# Patient Record
Sex: MALE | Race: WHITE | Employment: FULL TIME | URBAN - NONMETROPOLITAN AREA
[De-identification: names, ages, dates, MRNs, and addresses within clinical notes are randomized per-mention and may not be internally consistent; named-entity substitution may affect disease eponyms.]

---

## 2017-01-25 ENCOUNTER — OFFICE VISIT - GICH (OUTPATIENT)
Dept: FAMILY MEDICINE | Facility: OTHER | Age: 37
End: 2017-01-25

## 2017-01-25 ENCOUNTER — HISTORY (OUTPATIENT)
Dept: FAMILY MEDICINE | Facility: OTHER | Age: 37
End: 2017-01-25

## 2017-01-25 DIAGNOSIS — R69 ILLNESS: ICD-10-CM

## 2017-01-25 LAB
INFLUENZA ANTIGEN - HISTORICAL: NORMAL
STREP A ANTIGEN - HISTORICAL: NEGATIVE

## 2018-01-03 NOTE — PATIENT INSTRUCTIONS
"Patient Information     Patient Name MRN Dontrell Gonsalves [id number] Male 1980      Patient Instructions by Marysol Le MD at 2017  1:55 PM     Author:  Marysol Le MD  Service:  (none) Author Type:  Physician     Filed:  2017  2:31 PM  Encounter Date:  2017 Status:  Addendum     :  Marysol Le MD (Physician)        Related Notes: Original Note by Marysol Le MD (Physician) filed at 2017  1:55 PM            1.  Keep up on fluids  2.  Rest  3.  Work note provided  4.  Follow up worsening symptoms.         5  Weight Loss Strategies  1. Eat at least 3 meals a day and never skip breakfast.  2. Eat more slowly.  3. Decrease portion size.  4. Provide structure by using meal replacement bars or shakes, and/or low calorie frozen meals.  5. For good nutrition incorporate fruit, vegetables, whole grains, lean protein, and low-fat dairy.  6. Remove trigger foods from your environment to avoid impulse eating.  7. Increase physical activity: get a pedometer and aim for 10,000 steps a day or 30-35 minutes of activity 5 days per week.  8. Weigh yourself daily or at least weekly.  9. Keep a record of what you eat and your activity.  10. Establish a support system such as a friend, group or program.      11.  Read Constantine Raymond's \"Eat to Live\"   12.   Remember it is important to have a minimum of 1800 calories a day, okay to use olive oil, 40 grams of fiber daily.  No more than two servings ( the size of your palm) of red meat a week.      [employer] [place] Related topics   Colds   What are colds?   Colds are an infection of the head and chest caused by a virus. They are a type of upper respiratory infection (URI). They can affect your nose, throat, sinuses, eyes, and ears. A cold can also affect the tube that connects your middle ear and throat, as well as your windpipe, voice box, and the airways in your lungs.  What is the cause?  Over " 200 different viruses can cause colds. The infection spreads when viruses are passed to others by sneezing, coughing, or touching. You may also become infected by handling objects that were touched by someone with a cold. Some of the cold viruses live up to 3 hours on the skin and on objects, such as doorknobs or telephones.  You are more likely to get a cold if:    You are stressed.    You are tired.    You do not eat a healthy diet.    You are a smoker or are exposed to secondhand smoke.    You are living or working in crowded conditions.    You don t wash your hands often.  People tend to get fewer colds as they get older because they have already had many colds and their immune system is able to fight off some of the viruses that can cause colds.  What are the symptoms?   You usually start having cold symptoms 1 to 3 days after contact with a cold virus. Symptoms may include:    Scratchy or sore throat    Sneezing    Runny or stuffy nose    Cough    Watery eyes    Ears that feel stuffy or blocked    Slight fever (99 to 100 F, or 37.2 to 37.8 C)    Feeling tired    Headache    Loss of appetite  Cold and flu symptoms are similar. The difference is that when you have the flu, the symptoms start within a few hours. The symptoms of a cold develop more slowly.  How are they treated?   Most of the time you don t need to see your healthcare provider for treatment. There are no medicines that cure a cold. Medicines that you can buy at most drugstores can help relieve your symptoms. You can:    Get lots of rest.    Drink extra fluids, such as water, fruit juice, and tea.    Use a humidifier to put more moisture in the air. Avoid steam vaporizers because they can cause burns. Be sure to keep the humidifier clean, as recommended in the 's instructions. It's important to keep bacteria and mold from growing in the water container.    Take nonprescription medicine, such as acetaminophen, ibuprofen, or naproxen to  "treat pain and fever. Read the label and take as directed. Unless recommended by your healthcare provider, you should not take these medicines for more than 10 days.    Nonsteroidal anti-inflammatory medicines (NSAIDs), such as ibuprofen, naproxen, and aspirin, may cause stomach bleeding and other problems. These risks increase with age.    Acetaminophen may cause liver damage or other problems. Unless recommended by your provider, don't take more than 3000 milligrams (mg) in 24 hours. To make sure you don t take too much, check other medicines you take to see if they also contain acetaminophen. Ask your provider if you need to avoid drinking alcohol while taking this medicine.    Decongestants pills or nasal spray may reduce swelling in your nose and sinuses and lessen the amount of mucus. Use decongestants as directed. If you are using a nonprescription nasal-spray decongestant, generally you should not use it for more than 3 days. After 3 days it may make your symptoms worse. Ask your healthcare provider if it is OK for you to use a nasal spray decongestant longer than this.    Use cough drops, pain relievers, or salt water gargles for a sore throat. You can make a salt water gargle with 1 teaspoon table salt in 1 cup (8 ounces) of warm water.    You can buy many different medicines for coughs without a prescription. However, there is no proof that they will actually help your cough. Cough medicines may cause harm to young children, but they are generally safe for older children and adults.    If you need relief from a dry, hacking cough, choose a medicine labeled \"cough suppressant.\" A cough suppressant may help you cough less and sleep better. Cough medicines with the initials DM in the name contain the suppressant drug called dextromethorphan.    If you need to loosen mucus, choose a medicine labeled \"expectorant.\" Expectorants may help keep your mucus thin and bring it up from the lungs when you cough. This " may relieve chest congestion and make it easier to breathe. The drug used most often as an expectorant is guaifenesin.    Do not give a child under age 4 any cough and cold medicines unless you have instructions from your healthcare provider. Children over 6 years of age may be given cough drops or hard candies to relieve a sore throat or cough.    If you are pregnant, check first with your healthcare provider before taking any cold or cough medicines.  Colds usually last 1 to 2 weeks. Contact your healthcare provider if you have new or worsening symptoms or your symptoms last longer than 2 weeks.  How can I help prevent colds?  If you are sick, you can help protect others if you:    Don t go to work or school. Avoid contact with other people except to get medical care.    Cover your nose and mouth with a tissue when you cough or sneeze. Throw the tissue in the trash after you use it, and then wash your hands. If you don t have a tissue, cough or sneeze into your upper sleeve instead of your hands.    Clean your hands often with soap and water or an alcohol-based hand , especially after using tissues or coughing or sneezing into your hands.  To lower your risk of catching a cold:    Wash your hands often and especially after using the restroom, coughing, sneezing, or blowing your nose. Also wash your hands before eating or touching your eyes.    Stay at least 6 feet away from people who are sick, if you can.    Keep surfaces clean--especially bedside tables, surfaces in the bathroom, and toys for children. Some viruses and bacteria can live 2 hours or more on surfaces like cafeteria tables, doorknobs, and desks. Wipe them down with a household disinfectant according to directions on the label.    Take care of your health. Try to get at least 7 to 9 hours of sleep each night. Eat a healthy diet and try to keep a healthy weight. If you smoke, try to quit. If you want to drink alcohol, ask your healthcare  provider how much is safe for you to drink. Learn ways to manage stress. Exercise according to your healthcare provider's instructions.  Developed by [employer].  Adult Advisor 2016.2 published by [employer].  Last modified: 2014-10-21  Last reviewed: 2014-09-04  This content is reviewed periodically and is subject to change as new health information becomes available. The information is intended to inform and educate and is not a replacement for medical evaluation, advice, diagnosis or treatment by a healthcare professional.  References   Adult Advisor 2016.2 Index    Copyright   2016 [employer], a division of [employer] All rights reserved.        Index [place] Related topics   Strep Throat Test   What is a strep test?   A strep test looks for infection in the throat caused by bacteria called group A streptococcus.  Why is it done?   A strep test is done to find out if strep bacteria are causing a sore throat. If the test finds strep bacteria, your healthcare provider may prescribe antibiotics. Treatment with antibiotics may help you feel better sooner than if you do not have treatment. More importantly, it also lowers the chance of more serious problems that can be caused by strep, such as heart problems from rheumatic fever. Most other common causes of sore throat do not need treatment with antibiotics.  How do I prepare for this test?   You may need to avoid taking certain medicines before the test because they might affect the test result. Make sure your healthcare provider knows about any medicines, herbs, or supplements that you are taking. Tell your healthcare provider if you took antibiotics during the 3 days before the test. Don't stop any of your regular medicines without first consulting with your healthcare provider.  Talk to your healthcare provider if you have any questions about the test.  How is the test done?   The strep test may be done in 2 ways: a rapid strep test or a throat  culture. For both tests your healthcare provider gets a sample by rubbing a cotton swab against the back of the throat. The sample is sent to a lab.    If the rapid strep test is done, the lab looks for a substance made by strep bacteria in the throat sample. If the test finds this substance, the result is positive and it means that strep bacteria were in the sample. The lab will have this result in 1 hour or less. Your healthcare provider may be able to do this test in their office.    If a throat culture is done, the swab is sent to the lab and tested for growth of the bacteria or other germs. If you have an infection, it may take several days to find out what kind of germ is causing it.  Ask your healthcare provider when and how you will get the result of the test.  What does the test result mean?   Usually, a positive strep test means that you have strep, and a negative (normal) result means that you do not have strep.  The rapid strep is very accurate and reliable if it is positive. If it is negative, your provider may choose to send a throat culture to confirm that the result is accurate.   What if my test result is not normal?   Test results are only one part of a larger picture that takes into account your medical history, physical exam, and current health. Sometimes a test needs to be repeated to check the first result. Talk to your healthcare provider about your result and ask questions, such as:    If you need more tests    What kind of treatment you might need    What other changes you might need to make  Developed by [employer].  Adult Advisor 2016.2 published by [employer].  Last modified: 2014-09-23  Last reviewed: 2014-09-22  This content is reviewed periodically and is subject to change as new health information becomes available. The information is intended to inform and educate and is not a replacement for medical evaluation, advice, diagnosis or treatment by a healthcare  professional.  References   Adult Advisor 2016.2 Index    Copyright   2016 [employer], a division of [employer] All rights reserved.

## 2018-01-03 NOTE — PROGRESS NOTES
Patient Information     Patient Name MRN Sex     Dontrell Garcia [id number] Male 1980      Progress Notes by Marysol Le MD at 2017  1:15 PM     Author:  Marysol Le MD Service:  (none) Author Type:  Physician     Filed:  2017  2:33 PM Encounter Date:  2017 Status:  Signed     :  Marysol Le MD (Physician)            Nursing Notes:   Muna Zazueta  2017  1:16 PM  Signed  Patient states he is here today for a illness it started yesterday. It started with a fever yesterday, he had a headache, body aches, and a sore throat. His sore throat is gone today but he still feels ill. He needs a work note stating that he was here today.  Muna Zazueta LPN.......................... 2017  1:12 PM        Subjective:  Dontrell Garcia is a 36 y.o. male who presents for upper respiratory infection     Patient reports that he has been ill since last night.  He started having sore throat, then headache then ran fever of 37.5.   He has body aches.   He is a nonsmoker.  He does not have asthma.   He did get the flu shot this year.      No nausea, vomiting, no diarrhea.  He denies any chest pain, cough, shortness of breath.  No palpitation.  No rash       Immunization History     Administered  Date(s) Administered     Influenza Virus, Unspecified 2016     Tdap 10/21/2016           No Known Allergies;l    No current outpatient prescriptions on file prior to visit.     No current facility-administered medications on file prior to visit.      Patient Active Problem List     Diagnosis  Code     Numbness R20.0         Social Hx:  Social History     Substance Use Topics       Smoking status: Never Smoker     Smokeless tobacco: Never Used     Alcohol use No     Social History Narrative    Preloaded 3/11/2013.                Family Hx:   Family History       Problem   Relation Age of Onset     Other  Mother      RA       Diabetes  Maternal  "Grandfather      Heart Disease  Maternal Grandfather      Cancer  Maternal Grandfather      pancreatic       Cancer-breast  Maternal Grandmother      Cancer  Paternal Grandmother      pancreatic         Objective:  Visit Vitals       /78     Pulse 92     Temp 97.8  F (36.6  C) (Temporal)     Ht 1.702 m (5' 7\")     Wt (!) 137 kg (302 lb)     BMI 47.3 kg/m2         Patient appears ill. PERRLA EOMI with mild conjunctivitis TMs are clear oral pharynx with mild erythema mild postnasal drip sinuses are nontender. Neck is thick supple without adenopathy lungs clear to auscultation without wheezes rhonchi or rales heart sounds S1-S2 and a regular rate and rhythm. Skin is warm to touch. Abdomen is obese soft nontender. Ext without edema     Results for orders placed or performed in visit on 01/25/17       INFLUENZA ANTIGEN A & B  CLINIC IN HOUSE       Result   Value Ref Range    INFLUENZA ANTIGEN                                 Negative for Influenza A antigen; Negative for Influenza B antigen     THROAT RAPID STREP A WITH REFLEX       Result   Value Ref Range    STREP A ANTIGEN            Negative Negative         Assessment:    ICD-10-CM    1. Illness R69 INFLUENZA ANTIGEN A & B  CLINIC IN HOUSE      INFLUENZA ANTIGEN A & B  CLINIC IN HOUSE      THROAT RAPID STREP A WITH REFLEX      THROAT RAPID STREP A WITH REFLEX   2. Influenza-like illness R69        Mr. Garcia's Body mass index is 47.3 kg/(m^2). This is out of the normal range for a 36 y.o. Normal range for ages 18-64 is between 18.5 and 24.9; normal range for ages 65+ is 23-30. To lose weight we reviewed risks and benefits of appropriate options such as diet, exercise, and medications. Patient's strategy will be  self-directed nutrition plan and self-directed exercise program     discussed need for rest, fluids  Follow up when necessary  Plan:   -- Expected clinical course discussed   -- Medications and their side effects discussed  Patient Instructions   1.  " "Keep up on fluids  2.  Rest  3.  Work note provided  4.  Follow up worsening symptoms.         5  Weight Loss Strategies  1. Eat at least 3 meals a day and never skip breakfast.  2. Eat more slowly.  3. Decrease portion size.  4. Provide structure by using meal replacement bars or shakes, and/or low calorie frozen meals.  5. For good nutrition incorporate fruit, vegetables, whole grains, lean protein, and low-fat dairy.  6. Remove trigger foods from your environment to avoid impulse eating.  7. Increase physical activity: get a pedometer and aim for 10,000 steps a day or 30-35 minutes of activity 5 days per week.  8. Weigh yourself daily or at least weekly.  9. Keep a record of what you eat and your activity.  10. Establish a support system such as a friend, group or program.      11.  Read Constantine Raymond's \"Eat to Live\"   12.   Remember it is important to have a minimum of 1800 calories a day, okay to use olive oil, 40 grams of fiber daily.  No more than two servings ( the size of your palm) of red meat a week.      Index Kyrgyz Related topics   Colds   What are colds?   Colds are an infection of the head and chest caused by a virus. They are a type of upper respiratory infection (URI). They can affect your nose, throat, sinuses, eyes, and ears. A cold can also affect the tube that connects your middle ear and throat, as well as your windpipe, voice box, and the airways in your lungs.  What is the cause?  Over 200 different viruses can cause colds. The infection spreads when viruses are passed to others by sneezing, coughing, or touching. You may also become infected by handling objects that were touched by someone with a cold. Some of the cold viruses live up to 3 hours on the skin and on objects, such as doorknobs or telephones.  You are more likely to get a cold if:    You are stressed.    You are tired.    You do not eat a healthy diet.    You are a smoker or are exposed to secondhand smoke.    You are living or " working in crowded conditions.    You don t wash your hands often.  People tend to get fewer colds as they get older because they have already had many colds and their immune system is able to fight off some of the viruses that can cause colds.  What are the symptoms?   You usually start having cold symptoms 1 to 3 days after contact with a cold virus. Symptoms may include:    Scratchy or sore throat    Sneezing    Runny or stuffy nose    Cough    Watery eyes    Ears that feel stuffy or blocked    Slight fever (99 to 100 F, or 37.2 to 37.8 C)    Feeling tired    Headache    Loss of appetite  Cold and flu symptoms are similar. The difference is that when you have the flu, the symptoms start within a few hours. The symptoms of a cold develop more slowly.  How are they treated?   Most of the time you don t need to see your healthcare provider for treatment. There are no medicines that cure a cold. Medicines that you can buy at most drugstores can help relieve your symptoms. You can:    Get lots of rest.    Drink extra fluids, such as water, fruit juice, and tea.    Use a humidifier to put more moisture in the air. Avoid steam vaporizers because they can cause burns. Be sure to keep the humidifier clean, as recommended in the 's instructions. It's important to keep bacteria and mold from growing in the water container.    Take nonprescription medicine, such as acetaminophen, ibuprofen, or naproxen to treat pain and fever. Read the label and take as directed. Unless recommended by your healthcare provider, you should not take these medicines for more than 10 days.    Nonsteroidal anti-inflammatory medicines (NSAIDs), such as ibuprofen, naproxen, and aspirin, may cause stomach bleeding and other problems. These risks increase with age.    Acetaminophen may cause liver damage or other problems. Unless recommended by your provider, don't take more than 3000 milligrams (mg) in 24 hours. To make sure you don t  "take too much, check other medicines you take to see if they also contain acetaminophen. Ask your provider if you need to avoid drinking alcohol while taking this medicine.    Decongestants pills or nasal spray may reduce swelling in your nose and sinuses and lessen the amount of mucus. Use decongestants as directed. If you are using a nonprescription nasal-spray decongestant, generally you should not use it for more than 3 days. After 3 days it may make your symptoms worse. Ask your healthcare provider if it is OK for you to use a nasal spray decongestant longer than this.    Use cough drops, pain relievers, or salt water gargles for a sore throat. You can make a salt water gargle with 1 teaspoon table salt in 1 cup (8 ounces) of warm water.    You can buy many different medicines for coughs without a prescription. However, there is no proof that they will actually help your cough. Cough medicines may cause harm to young children, but they are generally safe for older children and adults.    If you need relief from a dry, hacking cough, choose a medicine labeled \"cough suppressant.\" A cough suppressant may help you cough less and sleep better. Cough medicines with the initials DM in the name contain the suppressant drug called dextromethorphan.    If you need to loosen mucus, choose a medicine labeled \"expectorant.\" Expectorants may help keep your mucus thin and bring it up from the lungs when you cough. This may relieve chest congestion and make it easier to breathe. The drug used most often as an expectorant is guaifenesin.    Do not give a child under age 4 any cough and cold medicines unless you have instructions from your healthcare provider. Children over 6 years of age may be given cough drops or hard candies to relieve a sore throat or cough.    If you are pregnant, check first with your healthcare provider before taking any cold or cough medicines.  Colds usually last 1 to 2 weeks. Contact your healthcare " provider if you have new or worsening symptoms or your symptoms last longer than 2 weeks.  How can I help prevent colds?  If you are sick, you can help protect others if you:    Don t go to work or school. Avoid contact with other people except to get medical care.    Cover your nose and mouth with a tissue when you cough or sneeze. Throw the tissue in the trash after you use it, and then wash your hands. If you don t have a tissue, cough or sneeze into your upper sleeve instead of your hands.    Clean your hands often with soap and water or an alcohol-based hand , especially after using tissues or coughing or sneezing into your hands.  To lower your risk of catching a cold:    Wash your hands often and especially after using the restroom, coughing, sneezing, or blowing your nose. Also wash your hands before eating or touching your eyes.    Stay at least 6 feet away from people who are sick, if you can.    Keep surfaces clean--especially bedside tables, surfaces in the bathroom, and toys for children. Some viruses and bacteria can live 2 hours or more on surfaces like cafeteria tables, doorknobs, and desks. Wipe them down with a household disinfectant according to directions on the label.    Take care of your health. Try to get at least 7 to 9 hours of sleep each night. Eat a healthy diet and try to keep a healthy weight. If you smoke, try to quit. If you want to drink alcohol, ask your healthcare provider how much is safe for you to drink. Learn ways to manage stress. Exercise according to your healthcare provider's instructions.  Developed by [employer]  Adult Advisor 2016.2 published by [employer]  Last modified: 2014-10-21  Last reviewed: 2014-09-04  This content is reviewed periodically and is subject to change as new health information becomes available. The information is intended to inform and educate and is not a replacement for medical evaluation, advice, diagnosis or treatment by a healthcare  professional.  References   Adult Advisor 2016.2 Index    Copyright   2016 "[employer]", a division of [employer] All rights reserved.        Index Italian Related topics   Strep Throat Test   What is a strep test?   A strep test looks for infection in the throat caused by bacteria called group A streptococcus.  Why is it done?   A strep test is done to find out if strep bacteria are causing a sore throat. If the test finds strep bacteria, your healthcare provider may prescribe antibiotics. Treatment with antibiotics may help you feel better sooner than if you do not have treatment. More importantly, it also lowers the chance of more serious problems that can be caused by strep, such as heart problems from rheumatic fever. Most other common causes of sore throat do not need treatment with antibiotics.  How do I prepare for this test?   You may need to avoid taking certain medicines before the test because they might affect the test result. Make sure your healthcare provider knows about any medicines, herbs, or supplements that you are taking. Tell your healthcare provider if you took antibiotics during the 3 days before the test. Don't stop any of your regular medicines without first consulting with your healthcare provider.  Talk to your healthcare provider if you have any questions about the test.  How is the test done?   The strep test may be done in 2 ways: a rapid strep test or a throat culture. For both tests your healthcare provider gets a sample by rubbing a cotton swab against the back of the throat. The sample is sent to a lab.    If the rapid strep test is done, the lab looks for a substance made by strep bacteria in the throat sample. If the test finds this substance, the result is positive and it means that strep bacteria were in the sample. The lab will have this result in 1 hour or less. Your healthcare provider may be able to do this test in their office.    If a throat culture is  done, the swab is sent to the lab and tested for growth of the bacteria or other germs. If you have an infection, it may take several days to find out what kind of germ is causing it.  Ask your healthcare provider when and how you will get the result of the test.  What does the test result mean?   Usually, a positive strep test means that you have strep, and a negative (normal) result means that you do not have strep.  The rapid strep is very accurate and reliable if it is positive. If it is negative, your provider may choose to send a throat culture to confirm that the result is accurate.   What if my test result is not normal?   Test results are only one part of a larger picture that takes into account your medical history, physical exam, and current health. Sometimes a test needs to be repeated to check the first result. Talk to your healthcare provider about your result and ask questions, such as:    If you need more tests    What kind of treatment you might need    What other changes you might need to make  Developed by [employer].  Adult Advisor 2016.2 published by [employer].  Last modified: 2014-09-23  Last reviewed: 2014-09-22  This content is reviewed periodically and is subject to change as new health information becomes available. The information is intended to inform and educate and is not a replacement for medical evaluation, advice, diagnosis or treatment by a healthcare professional.  References   Adult Advisor 2016.2 Index    Copyright   2016 [employer], a division of [employer] All rights reserved.           Electronically signed by Marysol Le MD

## 2018-01-03 NOTE — NURSING NOTE
Patient Information     Patient Name MRN Dontrell Gonsalves [id number] Male 1980      Nursing Note by Muna Zazueta at 2017  1:15 PM     Author:  Muna Zazueta Service:  (none) Author Type:  (none)     Filed:  2017  1:16 PM Encounter Date:  2017 Status:  Signed     :  Muna Zazueta            Patient states he is here today for a illness it started yesterday. It started with a fever yesterday, he had a headache, body aches, and a sore throat. His sore throat is gone today but he still feels ill. He needs a work note stating that he was here today.  Muna Zazueta LPN.......................... 2017  1:12 PM

## 2018-01-26 ENCOUNTER — DOCUMENTATION ONLY (OUTPATIENT)
Dept: FAMILY MEDICINE | Facility: OTHER | Age: 38
End: 2018-01-26

## 2018-01-26 VITALS
WEIGHT: 302 LBS | HEIGHT: 67 IN | HEART RATE: 92 BPM | TEMPERATURE: 97.8 F | SYSTOLIC BLOOD PRESSURE: 126 MMHG | BODY MASS INDEX: 47.4 KG/M2 | DIASTOLIC BLOOD PRESSURE: 78 MMHG

## 2018-07-16 ENCOUNTER — OFFICE VISIT (OUTPATIENT)
Dept: PEDIATRICS | Facility: OTHER | Age: 38
End: 2018-07-16
Attending: INTERNAL MEDICINE

## 2018-07-16 VITALS
BODY MASS INDEX: 47.42 KG/M2 | HEIGHT: 68 IN | HEART RATE: 83 BPM | WEIGHT: 312.9 LBS | SYSTOLIC BLOOD PRESSURE: 143 MMHG | DIASTOLIC BLOOD PRESSURE: 96 MMHG

## 2018-07-16 DIAGNOSIS — Z13.29 SCREENING FOR THYROID DISORDER: ICD-10-CM

## 2018-07-16 DIAGNOSIS — Z13.220 LIPID SCREENING: ICD-10-CM

## 2018-07-16 DIAGNOSIS — I10 ESSENTIAL HYPERTENSION: Primary | ICD-10-CM

## 2018-07-16 DIAGNOSIS — E66.01 MORBID OBESITY DUE TO EXCESS CALORIES (H): ICD-10-CM

## 2018-07-16 DIAGNOSIS — Z13.1 SCREENING FOR DIABETES MELLITUS: ICD-10-CM

## 2018-07-16 DIAGNOSIS — R07.9 CHEST PAIN ON EXERTION: ICD-10-CM

## 2018-07-16 DIAGNOSIS — Z13.0 SCREENING, ANEMIA, DEFICIENCY, IRON: ICD-10-CM

## 2018-07-16 PROBLEM — R73.03 PRE-DIABETES: Status: ACTIVE | Noted: 2018-07-16

## 2018-07-16 LAB
ANION GAP SERPL CALCULATED.3IONS-SCNC: 9 MMOL/L (ref 3–14)
BUN SERPL-MCNC: 15 MG/DL (ref 7–25)
CALCIUM SERPL-MCNC: 9.6 MG/DL (ref 8.6–10.3)
CHLORIDE SERPL-SCNC: 104 MMOL/L (ref 98–107)
CHOLEST SERPL-MCNC: 265 MG/DL
CO2 SERPL-SCNC: 26 MMOL/L (ref 21–31)
CREAT SERPL-MCNC: 0.91 MG/DL (ref 0.7–1.3)
ERYTHROCYTE [DISTWIDTH] IN BLOOD BY AUTOMATED COUNT: 13.2 % (ref 10–15)
GFR SERPL CREATININE-BSD FRML MDRD: >90 ML/MIN/1.7M2
GLUCOSE SERPL-MCNC: 97 MG/DL (ref 70–105)
HBA1C MFR BLD: 5.9 % (ref 4–6)
HCT VFR BLD AUTO: 42.4 % (ref 40–53)
HDLC SERPL-MCNC: 39 MG/DL (ref 23–92)
HGB BLD-MCNC: 14.4 G/DL (ref 13.3–17.7)
LDLC SERPL CALC-MCNC: ABNORMAL MG/DL
MCH RBC QN AUTO: 29.7 PG (ref 26.5–33)
MCHC RBC AUTO-ENTMCNC: 34 G/DL (ref 31.5–36.5)
MCV RBC AUTO: 87 FL (ref 78–100)
NONHDLC SERPL-MCNC: 226 MG/DL
PLATELET # BLD AUTO: 292 10E9/L (ref 150–450)
POTASSIUM SERPL-SCNC: 3.9 MMOL/L (ref 3.5–5.1)
RBC # BLD AUTO: 4.85 10E12/L (ref 4.4–5.9)
SODIUM SERPL-SCNC: 139 MMOL/L (ref 134–144)
TRIGL SERPL-MCNC: 566 MG/DL
TSH SERPL DL<=0.05 MIU/L-ACNC: 1.46 IU/ML (ref 0.34–5.6)
WBC # BLD AUTO: 8.8 10E9/L (ref 4–11)

## 2018-07-16 PROCEDURE — 85027 COMPLETE CBC AUTOMATED: CPT | Performed by: INTERNAL MEDICINE

## 2018-07-16 PROCEDURE — 83036 HEMOGLOBIN GLYCOSYLATED A1C: CPT | Performed by: INTERNAL MEDICINE

## 2018-07-16 PROCEDURE — 80061 LIPID PANEL: CPT | Performed by: INTERNAL MEDICINE

## 2018-07-16 PROCEDURE — 84443 ASSAY THYROID STIM HORMONE: CPT | Performed by: INTERNAL MEDICINE

## 2018-07-16 PROCEDURE — 99214 OFFICE O/P EST MOD 30 MIN: CPT | Performed by: INTERNAL MEDICINE

## 2018-07-16 PROCEDURE — 80048 BASIC METABOLIC PNL TOTAL CA: CPT | Performed by: INTERNAL MEDICINE

## 2018-07-16 PROCEDURE — 36415 COLL VENOUS BLD VENIPUNCTURE: CPT | Performed by: INTERNAL MEDICINE

## 2018-07-16 PROCEDURE — 93000 ELECTROCARDIOGRAM COMPLETE: CPT | Performed by: INTERNAL MEDICINE

## 2018-07-16 RX ORDER — HYDROCHLOROTHIAZIDE 12.5 MG/1
TABLET ORAL
Qty: 60 TABLET | Refills: 0 | Status: SHIPPED | OUTPATIENT
Start: 2018-07-16 | End: 2019-02-26

## 2018-07-16 RX ORDER — HYDROCHLOROTHIAZIDE 25 MG/1
25 TABLET ORAL DAILY
Qty: 90 TABLET | Refills: 3 | Status: SHIPPED | OUTPATIENT
Start: 2018-08-16 | End: 2019-02-26

## 2018-07-16 NOTE — NURSING NOTE
Patient presents with concerns of hypertension.  Josseline Saravia LPN.........................7/16/2018  3:32 PM

## 2018-07-16 NOTE — MR AVS SNAPSHOT
After Visit Summary   7/16/2018    Dontrell Garcia    MRN: [id number]           Patient Information     Date Of Birth          1980        Visit Information        Provider Department      7/16/2018 3:15 PM Kody Moise MD [facility]        Today's Diagnoses     Essential hypertension    -  1    Morbid obesity due to excess calories (H)        Screening for diabetes mellitus        Lipid screening        Screening, anemia, deficiency, iron        Screening for thyroid disorder        Chest pain on exertion          Care Instructions     -- Low threshold for exertional stress test      Your BMI is Body mass index is 48.28 kg/(m^2).  (BMI ranges: Normal 18.5 - 25, Overweight 25 - 30, Obesity greater than 30,     Morbid Obesity greater than 40 or greater than 35 with associated conditions.)    Facts about losing weight:   -- Overweight and Obesity increase your risk for developing diabetes, high blood pressure and stroke, and shorten your life.   -- 90% of weight loss comes from dietary changes, only 10% from exercise    What should I do?   -- Work on 5-10% weight loss   -- Focus on a few healthy dietary changes   -- Eat more fresh fruits and vegetables, and fewer carbohydrates   -- Cut out all calorie-containing beverages (milk, juice, alcohol, etc)   -- Exercise every day   -- Weigh yourself once a week   -- Consider the DASH Diet (http://bit.DuXplore/DASHDiet - redirects to the NIH)   -- Consider Weight Watchers (http://www.weightwatchers.com)   -- Consider My Fitness Pal (iOS, Android, http://www.myfitnesspal.com)                Follow-ups after your visit        Future tests that were ordered for you today     Open Future Orders        Priority Expected Expires Ordered    Basic metabolic panel Routine  7/15/2019 7/16/2018            Who to contact     If you have questions or need follow up information about today's clinic visit or your schedule please contact   "[facility] directly at [phone].  Normal or non-critical lab and imaging results will be communicated to you by MyChart, letter or phone within 4 business days after the clinic has received the results. If you do not hear from us within 7 days, please contact the clinic through [employer] or phone. If you have a critical or abnormal lab result, we will notify you by phone as soon as possible.  Submit refill requests through [employer] or call your pharmacy and they will forward the refill request to us. Please allow 3 business days for your refill to be completed.          Additional Information About Your Visit        [employer] Information     [employer] lets you send messages to your doctor, view your test results, renew your prescriptions, schedule appointments and more. To sign up, go to [email or web address] . Click on \"Log in\" on the left side of the screen, which will take you to the Welcome page. Then click on \"Sign up Now\" on the right side of the page.     You will be asked to enter the access code listed below, as well as some personal information. Please follow the directions to create your username and password.     Your access code is: [email or web address]  Expires: 10/14/2018  4:06 PM     Your access code will  in 90 days. If you need help or a new code, please call your [facility] clinic or [phone].        Care EveryWhere ID     This is your Care EveryWhere ID. This could be used by other organizations to access your [facility] medical records  [id number]        Your Vitals Were     Pulse Height BMI (Body Mass Index)             83 5' 7.5\" (1.715 m) 48.28 kg/m2          Blood Pressure from Last 3 Encounters:   18 (!) 143/96   17 126/78   10/26/16 120/60    Weight from Last 3 Encounters:   18 312 lb 14.4 oz (141.9 kg)   17 (!) 302 lb (137 kg)   10/26/16 297 lb (134.7 kg)              We Performed the Following     Basic metabolic panel     CBC with platelets     EKG " 12-lead, tracing only     Hemoglobin A1c     Lipid Profile     Thyrotropin GH        Primary Care Provider Fax #    Physician No Ref-Primary [phone]       No address on file        Equal Access to Services     RENAN WILLIS : Beatriz Patrick, lissette buenoalfredoha, jean claude gerashelton sulaimanalexandra, geraldo maryin hayaan sulaimanirving kaur donita landaverde. [facility] [phone].    ATENCIÓN: Si habla español, tiene a nina disposición servicios gratuitos de asistencia lingüística. Llame al [phone].    We comply with applicable federal civil rights laws and [place] laws. We do not discriminate on the basis of race, color, national origin, age, disability, sex, sexual orientation, or gender identity.            Thank you!     Thank you for choosing [facility]  for your care. Our goal is always to provide you with excellent care. Hearing back from our patients is one way we can continue to improve our services. Please take a few minutes to complete the written survey that you may receive in the mail after your visit with us. Thank you!             Your Updated Medication List - Protect others around you: Learn how to safely use, store and throw away your medicines at www.disposemymeds.org.      Notice  As of 7/16/2018  4:07 PM    You have not been prescribed any medications.

## 2018-07-16 NOTE — PROGRESS NOTES
"Subjective  Dontrell Garcia is a 37 year old male who presents for multiple concerns.  He like to get his blood pressure checked.  It feels like when he had high blood pressure in the past during sports.  He could just feel a sensation that his blood pressure was elevated.  He thinks there might be something going on with his heart.  He is recently gained some weight and wants to work on losing it, wants to know if I have any recommendations.  Not currently taking any medications.  Has chest pains with exertion.  Described as worse with humidity.  No associated symptoms including no palpitations, dyspnea or diaphoresis, no radiation of pain.  Describes the pain as substernal in nature.  Declines STD testing.    Problem List/PMH: reviewed in EMR, and made relevant updates today.  Medications: reviewed in EMR, and made relevant updates today.  Allergies: reviewed in EMR, and made relevant updates today.    Social Hx:  Social History   Substance Use Topics     Smoking status: Never Smoker     Smokeless tobacco: Never Used     Alcohol use No     Social History     Social History Narrative    From [facility]    Runs business selling [employer]         I reviewed social history and made relevant updates today.    Family Hx:   Family History   Problem Relation Age of Onset     Other - See Comments Mother      RA     Diabetes Maternal Grandfather      Diabetes     HEART DISEASE Maternal Grandfather      Heart Disease     Cancer Maternal Grandfather      Cancer,pancreatic     Breast Cancer Maternal Grandmother      Cancer-breast     Cancer Paternal Grandmother      Cancer,pancreatic     No Known Problems Father        Objective  Vitals: reviewed in EMR.  BP (!) 143/96 (BP Location: Right arm)  Pulse 83  Ht 5' 7.5\" (1.715 m)  Wt 312 lb 14.4 oz (141.9 kg)  BMI 48.28 kg/m2    Gen: Pleasant male, NAD.  HEENT: MMM, no OP erythema.   Neck: Supple  CV: RRR no m/r/g.   Pulm: CTAB no w/r/r  GI: Soft, NT, ND. No HSM. No masses. " Bowel sounds present.  Neuro: Grossly intact  Msk: No lower extremity edema.  Skin: No concerning lesions.  Psychiatric: Normal affect and insight. Does not appear anxious or depressed.    Labs:  Lab Results   Component Value Date    HGB 15.1 09/02/2014    HCT 44.8 09/02/2014     09/02/2014     09/02/2014    BUN 13 09/02/2014    CO2 28 09/02/2014     Results for orders placed or performed in visit on 07/16/18   EKG 12-lead, tracing only    Narrative    EKG Interpretation:   Rhythm: Sinus  Rate: 80  Axis: Normal  Conduction: Borderline prolongation of QTc  QRS: Normal  ST Segments: Normal  T-wave: Normal  Chambers: Normal  PACs Present: No  PVCs Present: No    Impression:   Normal EKG.  Comparison unavailable.    Signed, Kody Moise MD  Internal Medicine & Pediatrics  7/16/2018  4:12 PM           Assessment    ICD-10-CM    1. Essential hypertension I10 Basic metabolic panel     Basic metabolic panel     hydrochlorothiazide 12.5 MG TABS tablet     hydrochlorothiazide (HYDRODIURIL) 25 MG tablet   2. Morbid obesity due to excess calories (H) E66.01    3. Screening for diabetes mellitus Z13.1 Hemoglobin A1c   4. Lipid screening Z13.220 Lipid Profile   5. Screening, anemia, deficiency, iron Z13.0 CBC with platelets   6. Screening for thyroid disorder Z13.29 Thyrotropin GH   7. Chest pain on exertion R07.9 EKG 12-lead, tracing only     Orders Placed This Encounter   Procedures     Basic metabolic panel     CBC with platelets     Hemoglobin A1c     Lipid Profile     Basic metabolic panel     Thyrotropin GH     EKG 12-lead, tracing only       With regards to chest pain we discussed the risks and benefits of proceeding with stress testing.  At this time the patient decided he would like to wait.  Warning signs were discussed and repeat evaluation recommended should concerning symptoms develop or worsen.  Agree with working on weight loss, strategies discussed.  Referral to dietitian offered, patient decided to  wait for now.  With regards to blood pressure is elevated but not severe.  I recommend a trial of hydrochlorothiazide starting with 12.5 mg daily for the first week.  He should have a lab only in a couple of weeks for verifying normal electrolyte and kidney function.  I recommend a nurse only blood pressure check at that time as well.    Plan   -- Expected clinical course discussed   -- Medications and their side effects discussed  Patient Instructions    -- Low threshold for exertional stress test      Your BMI is Body mass index is 48.28 kg/(m^2).  (BMI ranges: Normal 18.5 - 25, Overweight 25 - 30, Obesity greater than 30,     Morbid Obesity greater than 40 or greater than 35 with associated conditions.)    Facts about losing weight:   -- Overweight and Obesity increase your risk for developing diabetes, high blood pressure and stroke, and shorten your life.   -- 90% of weight loss comes from dietary changes, only 10% from exercise    What should I do?   -- Work on 5-10% weight loss   -- Focus on a few healthy dietary changes   -- Eat more fresh fruits and vegetables, and fewer carbohydrates   -- Cut out all calorie-containing beverages (milk, juice, alcohol, etc)   -- Exercise every day   -- Weigh yourself once a week   -- Consider the DASH Diet (http://bit.ly/DASHDiet - redirects to the NIH)   -- Consider Weight Watchers (http://www.weightwatchers.com)   -- Consider My Fitness Pal (iOS, Android, http://www.NextStep.iopal.com)            No Follow-up on file.    Signed, Kody Moise MD  Internal Medicine & Pediatrics

## 2018-07-17 PROBLEM — E78.2 MIXED HYPERLIPIDEMIA: Status: ACTIVE | Noted: 2018-07-17

## 2018-07-17 PROBLEM — E78.1 HYPERTRIGLYCERIDEMIA: Status: ACTIVE | Noted: 2018-07-17

## 2018-07-24 NOTE — PROGRESS NOTES
Patient Information     Patient Name  Dontrell Garcia MRN  [id number] Sex  Male   1980      Letter by Marysol Le MD at      Author:  Marysol Le MD Service:  (none) Author Type:  (none)    Filed:   Encounter Date:  2017 Status:  (Other)             Work/School Excuse and Restrictions       Arrived:               Discharged:                                                               2:15  PM  2017        Dontrell Garcia  was seen today in the [facility] clinic with influenza like  symptoms.  He is not to return to work until afebrile and no productive cough for 24 hours.       If you have any questions regarding the validity of this note please call the number above.             Sincerely,         Electronically signed by Marysol Le MD

## 2019-02-26 ENCOUNTER — OFFICE VISIT (OUTPATIENT)
Dept: PEDIATRICS | Facility: OTHER | Age: 39
End: 2019-02-26
Attending: INTERNAL MEDICINE
Payer: COMMERCIAL

## 2019-02-26 VITALS
BODY MASS INDEX: 43.95 KG/M2 | HEIGHT: 68 IN | SYSTOLIC BLOOD PRESSURE: 120 MMHG | DIASTOLIC BLOOD PRESSURE: 82 MMHG | WEIGHT: 290 LBS | HEART RATE: 76 BPM | TEMPERATURE: 98.1 F | RESPIRATION RATE: 14 BRPM

## 2019-02-26 DIAGNOSIS — L91.8 SKIN TAG: ICD-10-CM

## 2019-02-26 DIAGNOSIS — K62.5 RECTAL BLEEDING: Primary | ICD-10-CM

## 2019-02-26 DIAGNOSIS — K59.00 CONSTIPATION, UNSPECIFIED CONSTIPATION TYPE: ICD-10-CM

## 2019-02-26 PROCEDURE — 99213 OFFICE O/P EST LOW 20 MIN: CPT | Performed by: INTERNAL MEDICINE

## 2019-02-26 ASSESSMENT — PATIENT HEALTH QUESTIONNAIRE - PHQ9
5. POOR APPETITE OR OVEREATING: NOT AT ALL
SUM OF ALL RESPONSES TO PHQ QUESTIONS 1-9: 0

## 2019-02-26 ASSESSMENT — ANXIETY QUESTIONNAIRES
2. NOT BEING ABLE TO STOP OR CONTROL WORRYING: NOT AT ALL
6. BECOMING EASILY ANNOYED OR IRRITABLE: NOT AT ALL
1. FEELING NERVOUS, ANXIOUS, OR ON EDGE: NOT AT ALL
IF YOU CHECKED OFF ANY PROBLEMS ON THIS QUESTIONNAIRE, HOW DIFFICULT HAVE THESE PROBLEMS MADE IT FOR YOU TO DO YOUR WORK, TAKE CARE OF THINGS AT HOME, OR GET ALONG WITH OTHER PEOPLE: NOT DIFFICULT AT ALL
GAD7 TOTAL SCORE: 0
5. BEING SO RESTLESS THAT IT IS HARD TO SIT STILL: NOT AT ALL
3. WORRYING TOO MUCH ABOUT DIFFERENT THINGS: NOT AT ALL
7. FEELING AFRAID AS IF SOMETHING AWFUL MIGHT HAPPEN: NOT AT ALL

## 2019-02-26 ASSESSMENT — PAIN SCALES - GENERAL: PAINLEVEL: NO PAIN (0)

## 2019-02-26 ASSESSMENT — MIFFLIN-ST. JEOR: SCORE: 2211.99

## 2019-02-26 NOTE — NURSING NOTE
Patient presents to clinic for 3 episodes of blood in stool.  Dee Dee Huffman LPN ....................  2/26/2019   10:07 AM    No LMP for male patient.  Medication Reconciliation: complete    Dee Dee Huffman LPN  2/26/2019 10:07 AM

## 2019-02-26 NOTE — PROGRESS NOTES
"Jose Garcia is a 38 year old male who presents for blood in stool.  Occasionally recently he has noticed some red blood in the stool, 3 times.  Not associated with pain.  He has had some dark brown stools but no black stools.  He thinks the blood is been on the outside of the stool.  It seems to occur more when he has stress and over eats.  Describes his bowel movement is a hard log.  Has had a bulging in the past which had extended outside the anus and then retracted.  Saw  for this in the past and no treatment was recommended.  Has never had a colonoscopy.    Problem List/PMH: reviewed in EMR, and made relevant updates today.  Medications: reviewed in EMR, and made relevant updates today.  Allergies: reviewed in EMR, and made relevant updates today.    Social Hx:  Social History     Tobacco Use     Smoking status: Never Smoker     Smokeless tobacco: Never Used   Substance Use Topics     Alcohol use: Yes     Comment: on occasion     Drug use: Unknown     Types: Other     Comment: Drug use: No     Social History     Social History Narrative    From 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    Family Hx:   Family History   Problem Relation Age of Onset     Other - See Comments Mother         RA     Diabetes Maternal Grandfather         Diabetes     Heart Disease Maternal Grandfather         Heart Disease     Cancer Maternal Grandfather         Cancer,pancreatic     Breast Cancer Maternal Grandmother         Cancer-breast     Cancer Paternal Grandmother         Cancer,pancreatic     No Known Problems Father        Objective  Vitals: reviewed in EMR.  /82 (BP Location: Right arm, Patient Position: Sitting, Cuff Size: Adult Large)   Pulse 76   Temp 98.1  F (36.7  C) (Tympanic)   Resp 14   Ht 1.715 m (5' 7.5\")   Wt 131.5 kg (290 lb)   BMI 44.75 kg/m      Gen: Pleasant male, NAD.  HEENT: MMM  Neck: Supple  Pulm: " Breathing easily  Neuro: Grossly intact  Skin: Right shoulder several small skin tags  Psychiatric: Normal affect and insight. Does not appear anxious or depressed.      Assessment    ICD-10-CM    1. Rectal bleeding K62.5    2. Constipation, unspecified constipation type K59.00      I think the most likely is rectal bleeding is secondary to internal hemorrhoid and chronic constipation.  However we did discuss the differential including inflamed polyps, colon cancer, etc.  We discussed the possibility for referral and decided to treat conservatively at this point in time.  However if symptoms persist or worsen recommend repeat evaluation.  With regards to his skin tag I recommend use of fishing line to tie off at the base.  Recommended against clipping due to the probable complication of bleeding.    Plan   -- Expected clinical course discussed   -- Medications and their side effects discussed  Patient Instructions    -- Start polyethylene glycol (MiraLax) 2 capful two times a day for a few days, then cut back dose.  Most likely you'll be using 1 capful daily after a few days   -- MiraLax is safe for you to adjust the dose yourself at home. Changes in dose take 12-24 hours to show an effect   -- Progression will be small hard pellet stool, hard log stool, soft stool.  Expect some liquid stool as well.  Goal soft formed daily stool (applesauce consistency stools)   -- After 2-3 days, if you still feel constipated the next step up is to add Senna 1-2 tablets twice a day   -- Use MiraLax daily for at least a month to allow colon to regain strength   -- Drink more water   -- Eat more fruits and vegetables   -- No fiber supplements until at least 1 month out.  Fiber containing foods okay.   -- MiraLax is safe to use indefinitely   -- After 1 month, consider switching from MiraLax to daily fiber supplement (eg Citrucel 1 tbsp daily).   -- Low threshold to refer to Gen Surgery if bleeding persists      Signed, Kody Moise  MD  Internal Medicine & Pediatrics

## 2019-02-26 NOTE — PATIENT INSTRUCTIONS
-- Start polyethylene glycol (MiraLax) 2 capful two times a day for a few days, then cut back dose.  Most likely you'll be using 1 capful daily after a few days   -- MiraLax is safe for you to adjust the dose yourself at home. Changes in dose take 12-24 hours to show an effect   -- Progression will be small hard pellet stool, hard log stool, soft stool.  Expect some liquid stool as well.  Goal soft formed daily stool (applesauce consistency stools)   -- After 2-3 days, if you still feel constipated the next step up is to add Senna 1-2 tablets twice a day   -- Use MiraLax daily for at least a month to allow colon to regain strength   -- Drink more water   -- Eat more fruits and vegetables   -- No fiber supplements until at least 1 month out.  Fiber containing foods okay.   -- MiraLax is safe to use indefinitely   -- After 1 month, consider switching from MiraLax to daily fiber supplement (eg Citrucel 1 tbsp daily).   -- Low threshold to refer to Gen Surgery if bleeding persists

## 2019-02-27 ASSESSMENT — ANXIETY QUESTIONNAIRES: GAD7 TOTAL SCORE: 0

## 2019-08-05 ENCOUNTER — HOSPITAL ENCOUNTER (EMERGENCY)
Facility: OTHER | Age: 39
Discharge: HOME | End: 2019-08-05
Attending: FAMILY MEDICINE | Admitting: FAMILY MEDICINE
Payer: COMMERCIAL

## 2019-08-05 VITALS
HEIGHT: 65 IN | WEIGHT: 294.09 LBS | HEART RATE: 75 BPM | DIASTOLIC BLOOD PRESSURE: 79 MMHG | RESPIRATION RATE: 12 BRPM | SYSTOLIC BLOOD PRESSURE: 122 MMHG | OXYGEN SATURATION: 97 % | BODY MASS INDEX: 49 KG/M2 | TEMPERATURE: 98 F

## 2019-08-05 DIAGNOSIS — R07.9 CHEST PAIN, UNSPECIFIED TYPE: ICD-10-CM

## 2019-08-05 LAB
ALBUMIN SERPL-MCNC: 4.6 G/DL (ref 3.5–5.7)
ALP SERPL-CCNC: 49 U/L (ref 34–104)
ALT SERPL W P-5'-P-CCNC: 55 U/L (ref 7–52)
ANION GAP SERPL CALCULATED.3IONS-SCNC: 7 MMOL/L (ref 3–14)
AST SERPL W P-5'-P-CCNC: 22 U/L (ref 13–39)
BASOPHILS # BLD AUTO: 0.1 10E9/L (ref 0–0.2)
BASOPHILS NFR BLD AUTO: 0.8 %
BILIRUB SERPL-MCNC: 0.4 MG/DL (ref 0.3–1)
BUN SERPL-MCNC: 11 MG/DL (ref 7–25)
CALCIUM SERPL-MCNC: 9.6 MG/DL (ref 8.6–10.3)
CHLORIDE SERPL-SCNC: 106 MMOL/L (ref 98–107)
CO2 SERPL-SCNC: 27 MMOL/L (ref 21–31)
CREAT SERPL-MCNC: 0.9 MG/DL (ref 0.7–1.3)
DIFFERENTIAL METHOD BLD: NORMAL
EOSINOPHIL # BLD AUTO: 0.6 10E9/L (ref 0–0.7)
EOSINOPHIL NFR BLD AUTO: 6.8 %
ERYTHROCYTE [DISTWIDTH] IN BLOOD BY AUTOMATED COUNT: 13.3 % (ref 10–15)
GFR SERPL CREATININE-BSD FRML MDRD: >90 ML/MIN/{1.73_M2}
GLUCOSE SERPL-MCNC: 128 MG/DL (ref 70–105)
HCT VFR BLD AUTO: 41.1 % (ref 40–53)
HGB BLD-MCNC: 14.1 G/DL (ref 13.3–17.7)
IMM GRANULOCYTES # BLD: 0 10E9/L (ref 0–0.4)
IMM GRANULOCYTES NFR BLD: 0.2 %
LYMPHOCYTES # BLD AUTO: 3.4 10E9/L (ref 0.8–5.3)
LYMPHOCYTES NFR BLD AUTO: 40.8 %
MCH RBC QN AUTO: 29.9 PG (ref 26.5–33)
MCHC RBC AUTO-ENTMCNC: 34.3 G/DL (ref 31.5–36.5)
MCV RBC AUTO: 87 FL (ref 78–100)
MONOCYTES # BLD AUTO: 0.7 10E9/L (ref 0–1.3)
MONOCYTES NFR BLD AUTO: 7.9 %
NEUTROPHILS # BLD AUTO: 3.6 10E9/L (ref 1.6–8.3)
NEUTROPHILS NFR BLD AUTO: 43.5 %
PLATELET # BLD AUTO: 287 10E9/L (ref 150–450)
POTASSIUM SERPL-SCNC: 3.7 MMOL/L (ref 3.5–5.1)
PROT SERPL-MCNC: 7.4 G/DL (ref 6.4–8.9)
RBC # BLD AUTO: 4.72 10E12/L (ref 4.4–5.9)
SODIUM SERPL-SCNC: 140 MMOL/L (ref 134–144)
TROPONIN I SERPL-MCNC: <0.03 UG/L (ref 0–0.03)
WBC # BLD AUTO: 8.4 10E9/L (ref 4–11)

## 2019-08-05 PROCEDURE — 99283 EMERGENCY DEPT VISIT LOW MDM: CPT | Mod: Z6 | Performed by: FAMILY MEDICINE

## 2019-08-05 PROCEDURE — 84484 ASSAY OF TROPONIN QUANT: CPT | Performed by: FAMILY MEDICINE

## 2019-08-05 PROCEDURE — 80053 COMPREHEN METABOLIC PANEL: CPT | Performed by: FAMILY MEDICINE

## 2019-08-05 PROCEDURE — 36415 COLL VENOUS BLD VENIPUNCTURE: CPT | Performed by: FAMILY MEDICINE

## 2019-08-05 PROCEDURE — 99284 EMERGENCY DEPT VISIT MOD MDM: CPT | Performed by: FAMILY MEDICINE

## 2019-08-05 PROCEDURE — 93005 ELECTROCARDIOGRAM TRACING: CPT | Performed by: FAMILY MEDICINE

## 2019-08-05 PROCEDURE — 25000132 ZZH RX MED GY IP 250 OP 250 PS 637: Performed by: FAMILY MEDICINE

## 2019-08-05 PROCEDURE — 85025 COMPLETE CBC W/AUTO DIFF WBC: CPT | Performed by: FAMILY MEDICINE

## 2019-08-05 PROCEDURE — 93010 ELECTROCARDIOGRAM REPORT: CPT | Performed by: INTERNAL MEDICINE

## 2019-08-05 RX ORDER — ASPIRIN 81 MG/1
162 TABLET, CHEWABLE ORAL ONCE
Status: COMPLETED | OUTPATIENT
Start: 2019-08-05 | End: 2019-08-05

## 2019-08-05 RX ADMIN — ASPIRIN 81 MG 162 MG: 81 TABLET ORAL at 13:43

## 2019-08-05 ASSESSMENT — MIFFLIN-ST. JEOR: SCORE: 2196.49

## 2019-08-05 NOTE — ED AVS SNAPSHOT
[facility]  [address]  [place] [address]  Phone:  [phone]  Fax:  [phone]                                    Dontrell Garcia   MRN: [id number]    Department:  [facility]   Date of Visit:  8/5/2019           After Visit Summary Signature Page    I have received my discharge instructions, and my questions have been answered. I have discussed any challenges I see with this plan with the nurse or doctor.    ..........................................................................................................................................  Patient/Patient Representative Signature      ..........................................................................................................................................  Patient Representative Print Name and Relationship to Patient    ..................................................               ................................................  Date                                   Time    ..........................................................................................................................................  Reviewed by Signature/Title    ...................................................              ..............................................  Date                                               Time          22EPIC Rev 08/18

## 2019-08-05 NOTE — ED TRIAGE NOTES
Pt presents to the ER with complaint of chest pain that started yesterday.  He states he is starting to get numbness in left arm.  Slightly diaphoretic.  Denies shortness of breath.  Denies nausea.  No history of heart problems.

## 2019-08-05 NOTE — ED PROVIDER NOTES
History     Chief Complaint   Patient presents with     Chest Pain     HPI  Dontrell Garcia is a 38 year old male who presents with intermittent anterior chest pain and left arm tingling for 2 days.  It seems to affect him at night when he lays down.  Also, he felt some palpitations without near-syncope or syncope before going to bed each of the past 2 nights.    He also states he woke up sweaty this morning.    No SOB, nausea, or exertional symptoms.  No personal hx of CAD.    No pleuritic nature and no SOB.    He denies tearing/ripping into the back suggesting dissection.    Chest pain and arm tingling seem to get better during the day.      Allergies:  No Known Allergies    Problem List:    Patient Active Problem List    Diagnosis Date Noted     Hypertriglyceridemia 07/17/2018     Priority: Medium     Mixed hyperlipidemia 07/17/2018     Priority: Medium     Essential hypertension 07/16/2018     Priority: Medium     Morbid obesity due to excess calories (H) 07/16/2018     Priority: Medium     Pre-diabetes 07/16/2018     Priority: Medium        Past Medical History:    Past Medical History:   Diagnosis Date     Mumps without complication        Past Surgical History:    Past Surgical History:   Procedure Laterality Date     APPENDECTOMY OPEN      No Comments Provided       Family History:    Family History   Problem Relation Age of Onset     Other - See Comments Mother         RA     Diabetes Maternal Grandfather         Diabetes     Heart Disease Maternal Grandfather         Heart Disease     Cancer Maternal Grandfather         Cancer,pancreatic     Breast Cancer Maternal Grandmother         Cancer-breast     Cancer Paternal Grandmother         Cancer,pancreatic     No Known Problems Father        Social History:  Marital Status:  Single [1]  Social History     Tobacco Use     Smoking status: Never Smoker     Smokeless tobacco: Never Used   Substance Use Topics     Alcohol use: Yes     Comment: on occasion      "Drug use: Unknown     Types: Other     Comment: Drug use: No        Medications:      No current outpatient medications on file.      Review of Systems  No fevers, chills, rigors, HEENT, abdominal concerns  Physical Exam   BP: (!) 151/101  Pulse: 80  Heart Rate: 84  Temp: 98  F (36.7  C)  Resp: 16  Height: 166 cm (5' 5.35\")  Weight: 133.4 kg (294 lb 1.5 oz)  SpO2: 96 %      Physical Examwell man.  Heart reg.  Lungs clear without tachypnea.  I cannot press on his chest wall and induce pain.  Normal finger opposition, abduction, wrist flex/extension, normal sensation to left arm.  No DVT signs or ankle edema.    EKG shows normal sinus, no concerning ST or T changes  Troponin negative, with over 2 days of symptoms I did not feel further testing was indicated.  Patient declined a CXR due to wanting to leave.  He was informed why we do CXR for chest pain (evaluating for aortic dissection and pneumothorax and other respiratory issues) but he still declined.      ED Course        Procedures               Critical Care time:  none               Results for orders placed or performed during the hospital encounter of 08/05/19 (from the past 24 hour(s))   CBC with platelets differential   Result Value Ref Range    WBC 8.4 4.0 - 11.0 10e9/L    RBC Count 4.72 4.4 - 5.9 10e12/L    Hemoglobin 14.1 13.3 - 17.7 g/dL    Hematocrit 41.1 40.0 - 53.0 %    MCV 87 78 - 100 fl    MCH 29.9 26.5 - 33.0 pg    MCHC 34.3 31.5 - 36.5 g/dL    RDW 13.3 10.0 - 15.0 %    Platelet Count 287 150 - 450 10e9/L    Diff Method Automated Method     % Neutrophils 43.5 %    % Lymphocytes 40.8 %    % Monocytes 7.9 %    % Eosinophils 6.8 %    % Basophils 0.8 %    % Immature Granulocytes 0.2 %    Absolute Neutrophil 3.6 1.6 - 8.3 10e9/L    Absolute Lymphocytes 3.4 0.8 - 5.3 10e9/L    Absolute Monocytes 0.7 0.0 - 1.3 10e9/L    Absolute Eosinophils 0.6 0.0 - 0.7 10e9/L    Absolute Basophils 0.1 0.0 - 0.2 10e9/L    Abs Immature Granulocytes 0.0 0 - 0.4 10e9/L "   Comprehensive metabolic panel   Result Value Ref Range    Sodium 140 134 - 144 mmol/L    Potassium 3.7 3.5 - 5.1 mmol/L    Chloride 106 98 - 107 mmol/L    Carbon Dioxide 27 21 - 31 mmol/L    Anion Gap 7 3 - 14 mmol/L    Glucose 128 (H) 70 - 105 mg/dL    Urea Nitrogen 11 7 - 25 mg/dL    Creatinine 0.90 0.70 - 1.30 mg/dL    GFR Estimate >90 >60 mL/min/[1.73_m2]    GFR Estimate If Black >90 >60 mL/min/[1.73_m2]    Calcium 9.6 8.6 - 10.3 mg/dL    Bilirubin Total 0.4 0.3 - 1.0 mg/dL    Albumin 4.6 3.5 - 5.7 g/dL    Protein Total 7.4 6.4 - 8.9 g/dL    Alkaline Phosphatase 49 34 - 104 U/L    ALT 55 (H) 7 - 52 U/L    AST 22 13 - 39 U/L   Troponin I   Result Value Ref Range    Troponin I ES <0.030 0.000 - 0.034 ug/L       Medications   aspirin (ASA) chewable tablet 162 mg (162 mg Oral Given 8/5/19 1343)       Assessments & Plan (with Medical Decision Making)     I have reviewed the nursing notes.    I have reviewed the findings, diagnosis, plan and need for follow up with the patient.   patient is discharged in stable condition.  No indication he is having a cardiac cause to his pain.  Also doubt PE or dissection or pulmonary issue.   However, the patient declined a CXR twice and I tried to explain why we do CXR as standard of care for chest pain patients.  Therefore, I cannot give him any further recommendations.  He will return with worsening symptoms.    No evidence of ectopy regarding his concern of palpitations.         Medication List      There are no discharge medications for this visit.         Final diagnoses:   Chest pain, unspecified type       8/5/2019   [facility]  08/05/19 [id number]

## 2020-03-22 NOTE — PATIENT INSTRUCTIONS
Nursing Transfer Note      3/21/2020     Transfer to 542A from PACU    Transfer via stretcher    Transfer with     Transported by Patient Escort    Medicines sent:     Chart send with patient: Yes    Notified: spouse by telephone call          -- Low threshold for exertional stress test      Your BMI is Body mass index is 48.28 kg/(m^2).  (BMI ranges: Normal 18.5 - 25, Overweight 25 - 30, Obesity greater than 30,     Morbid Obesity greater than 40 or greater than 35 with associated conditions.)    Facts about losing weight:   -- Overweight and Obesity increase your risk for developing diabetes, high blood pressure and stroke, and shorten your life.   -- 90% of weight loss comes from dietary changes, only 10% from exercise    What should I do?   -- Work on 5-10% weight loss   -- Focus on a few healthy dietary changes   -- Eat more fresh fruits and vegetables, and fewer carbohydrates   -- Cut out all calorie-containing beverages (milk, juice, alcohol, etc)   -- Exercise every day   -- Weigh yourself once a week   -- Consider the DASH Diet (http://bit.ly/DASHDiet - redirects to the NIH)   -- Consider Weight Watchers (http://www.weightwatchSino Gas & Energy.com)   -- Consider My Fitness Pal (iOS, Android, http://www.Wowcracynesspal.com)

## 2020-05-09 ENCOUNTER — TELEPHONE (OUTPATIENT)
Dept: FAMILY MEDICINE | Facility: OTHER | Age: 40
End: 2020-05-09

## 2020-05-09 ENCOUNTER — HOSPITAL ENCOUNTER (EMERGENCY)
Facility: OTHER | Age: 40
Discharge: HOME | End: 2020-05-10
Attending: EMERGENCY MEDICINE | Admitting: EMERGENCY MEDICINE
Payer: COMMERCIAL

## 2020-05-09 ENCOUNTER — APPOINTMENT (OUTPATIENT)
Dept: GENERAL RADIOLOGY | Facility: OTHER | Age: 40
End: 2020-05-09
Attending: EMERGENCY MEDICINE
Payer: COMMERCIAL

## 2020-05-09 DIAGNOSIS — R07.9 CHEST PAIN, UNSPECIFIED TYPE: ICD-10-CM

## 2020-05-09 LAB
ANION GAP SERPL CALCULATED.3IONS-SCNC: 9 MMOL/L (ref 3–14)
BASOPHILS # BLD AUTO: 0.1 10E9/L (ref 0–0.2)
BASOPHILS NFR BLD AUTO: 0.8 %
BUN SERPL-MCNC: 21 MG/DL (ref 7–25)
CALCIUM SERPL-MCNC: 9.3 MG/DL (ref 8.6–10.3)
CHLORIDE SERPL-SCNC: 104 MMOL/L (ref 98–107)
CO2 SERPL-SCNC: 25 MMOL/L (ref 21–31)
CREAT SERPL-MCNC: 1.02 MG/DL (ref 0.7–1.3)
DIFFERENTIAL METHOD BLD: NORMAL
EOSINOPHIL # BLD AUTO: 0.3 10E9/L (ref 0–0.7)
EOSINOPHIL NFR BLD AUTO: 4.1 %
ERYTHROCYTE [DISTWIDTH] IN BLOOD BY AUTOMATED COUNT: 12.4 % (ref 10–15)
GFR SERPL CREATININE-BSD FRML MDRD: 82 ML/MIN/{1.73_M2}
GLUCOSE SERPL-MCNC: 108 MG/DL (ref 70–105)
HCT VFR BLD AUTO: 40.6 % (ref 40–53)
HGB BLD-MCNC: 14 G/DL (ref 13.3–17.7)
IMM GRANULOCYTES # BLD: 0 10E9/L (ref 0–0.4)
IMM GRANULOCYTES NFR BLD: 0.1 %
LYMPHOCYTES # BLD AUTO: 4.2 10E9/L (ref 0.8–5.3)
LYMPHOCYTES NFR BLD AUTO: 54.8 %
MCH RBC QN AUTO: 29.9 PG (ref 26.5–33)
MCHC RBC AUTO-ENTMCNC: 34.5 G/DL (ref 31.5–36.5)
MCV RBC AUTO: 87 FL (ref 78–100)
MONOCYTES # BLD AUTO: 0.7 10E9/L (ref 0–1.3)
MONOCYTES NFR BLD AUTO: 9.1 %
NEUTROPHILS # BLD AUTO: 2.4 10E9/L (ref 1.6–8.3)
NEUTROPHILS NFR BLD AUTO: 31.1 %
PLATELET # BLD AUTO: 284 10E9/L (ref 150–450)
POTASSIUM SERPL-SCNC: 3.5 MMOL/L (ref 3.5–5.1)
RBC # BLD AUTO: 4.69 10E12/L (ref 4.4–5.9)
SODIUM SERPL-SCNC: 138 MMOL/L (ref 134–144)
TROPONIN I SERPL-MCNC: 3.3 PG/ML
WBC # BLD AUTO: 7.6 10E9/L (ref 4–11)

## 2020-05-09 PROCEDURE — 80048 BASIC METABOLIC PNL TOTAL CA: CPT | Performed by: EMERGENCY MEDICINE

## 2020-05-09 PROCEDURE — 71046 X-RAY EXAM CHEST 2 VIEWS: CPT | Mod: TC

## 2020-05-09 PROCEDURE — 84484 ASSAY OF TROPONIN QUANT: CPT | Performed by: EMERGENCY MEDICINE

## 2020-05-09 PROCEDURE — 99285 EMERGENCY DEPT VISIT HI MDM: CPT | Mod: 25 | Performed by: EMERGENCY MEDICINE

## 2020-05-09 PROCEDURE — 99283 EMERGENCY DEPT VISIT LOW MDM: CPT | Mod: Z6 | Performed by: EMERGENCY MEDICINE

## 2020-05-09 PROCEDURE — 36415 COLL VENOUS BLD VENIPUNCTURE: CPT | Performed by: EMERGENCY MEDICINE

## 2020-05-09 PROCEDURE — 85025 COMPLETE CBC W/AUTO DIFF WBC: CPT | Performed by: EMERGENCY MEDICINE

## 2020-05-09 PROCEDURE — 93005 ELECTROCARDIOGRAM TRACING: CPT | Performed by: EMERGENCY MEDICINE

## 2020-05-09 PROCEDURE — 93010 ELECTROCARDIOGRAM REPORT: CPT | Performed by: INTERNAL MEDICINE

## 2020-05-09 PROCEDURE — 25000132 ZZH RX MED GY IP 250 OP 250 PS 637: Performed by: EMERGENCY MEDICINE

## 2020-05-09 RX ORDER — ASPIRIN 81 MG/1
324 TABLET, CHEWABLE ORAL ONCE
Status: COMPLETED | OUTPATIENT
Start: 2020-05-09 | End: 2020-05-09

## 2020-05-09 RX ORDER — NITROGLYCERIN 0.4 MG/1
0.4 TABLET SUBLINGUAL EVERY 5 MIN PRN
Status: DISCONTINUED | OUTPATIENT
Start: 2020-05-09 | End: 2020-05-10 | Stop reason: HOSPADM

## 2020-05-09 RX ORDER — ALUMINA, MAGNESIA, AND SIMETHICONE 2400; 2400; 240 MG/30ML; MG/30ML; MG/30ML
30 SUSPENSION ORAL ONCE
Status: COMPLETED | OUTPATIENT
Start: 2020-05-09 | End: 2020-05-09

## 2020-05-09 RX ADMIN — ASPIRIN 81 MG 324 MG: 81 TABLET ORAL at 22:56

## 2020-05-09 RX ADMIN — ALUMINUM HYDROXIDE, MAGNESIUM HYDROXIDE, AND DIMETHICONE 30 ML: 400; 400; 40 SUSPENSION ORAL at 22:57

## 2020-05-09 NOTE — TELEPHONE ENCOUNTER
"Patient presented to urgent care at 1700 with chest pain and L arm numbness. Recommended evaluation in the ED and patient refused. Discussed limitations of testing and monitoring available in the urgent care. Patient reported that his symptoms have been present for months, \"none of this is new, they didn't figure it out in the ED last time so I don't want to go back.\" Discussed that sometimes repeat evaluation is needed. Patient stated he came on a Saturday evening because he really just wants a stress test and to see a cardiologist. Did help him get scheduled for a clinic appointment to discuss appropriate follow up testing, but stressed the importance of ED evaluation multiple times if symptoms persist or worsen. Patient states understanding, refused ED evaluation. Taylor Sandoval MD   "

## 2020-05-09 NOTE — ED AVS SNAPSHOT
[facility]  [address]  [place] [address]  Phone:  [phone]  Fax:  [phone]                                    Dontrell Garcia   MRN: [id number]    Department:  [facility]   Date of Visit:  5/9/2020           After Visit Summary Signature Page    I have received my discharge instructions, and my questions have been answered. I have discussed any challenges I see with this plan with the nurse or doctor.    ..........................................................................................................................................  Patient/Patient Representative Signature      ..........................................................................................................................................  Patient Representative Print Name and Relationship to Patient    ..................................................               ................................................  Date                                   Time    ..........................................................................................................................................  Reviewed by Signature/Title    ...................................................              ..............................................  Date                                               Time          22EPIC Rev 08/18

## 2020-05-10 VITALS
SYSTOLIC BLOOD PRESSURE: 114 MMHG | TEMPERATURE: 98.5 F | HEIGHT: 65 IN | DIASTOLIC BLOOD PRESSURE: 63 MMHG | HEART RATE: 63 BPM | RESPIRATION RATE: 17 BRPM | BODY MASS INDEX: 49 KG/M2 | OXYGEN SATURATION: 96 %

## 2020-05-10 LAB — TROPONIN I SERPL-MCNC: 2.8 PG/ML

## 2020-05-10 PROCEDURE — 36415 COLL VENOUS BLD VENIPUNCTURE: CPT | Performed by: EMERGENCY MEDICINE

## 2020-05-10 PROCEDURE — 84484 ASSAY OF TROPONIN QUANT: CPT | Performed by: EMERGENCY MEDICINE

## 2020-05-10 NOTE — ED NOTES
Patient states he is fine at this time.  No complaints.  States his pain comes and goes.  Does not believe the Maalox helped/changed his pain.

## 2020-05-10 NOTE — DISCHARGE INSTRUCTIONS
Please start taking a daily baby aspirin until you are told not to otherwise by your primary provider or a cardiologist.  This can be found any over-the-counter pharmacy.  You may also try as needed Maalox or Pepto-Bismol as you are feeling pain over your stomach that is relieved by these medications.  Return to the emergency room if you develop worsening chest pain, shortness of breath, or any new concerning symptoms.

## 2020-05-10 NOTE — ED NOTES
Administering and patient states he has no pain at this time.  Nitroglycerin held due to no pain at this time.

## 2020-05-10 NOTE — ED PROVIDER NOTES
History     Chief Complaint   Patient presents with     Chest Pain     HPI  Dontrell Garcia is a 39 year old male who reports past medical history of hypertension and hyperlipidemia who presents with chest pain.  Patient arrives via private vehicle states he is currently having chest pain.  Previously had a rapid clinic visit earlier today and was instructed to go to the emergency room for further investigation.  States he has had this chest pain intermittently for weeks to months however today he noticed worsening of another sensation is been going on described as left arm numbness.  Also states that when he touched the back of his arm and left shoulder and left neck it center shooting pain of his left neck.  This is separate from the chest pain described as heart pain and heart pressure on the left side of his chest.  He is eating and drinking normally and denies any exacerbation or relief from eating food.  Denies any history of blood clots or leg swelling or any trauma.  Denies any smoking or known diabetes or family history of heart attack before the age of 50.     Allergies:  Allergies   Allergen Reactions     Bee Venom Rash       Problem List:    Patient Active Problem List    Diagnosis Date Noted     Hypertriglyceridemia 07/17/2018     Priority: Medium     Mixed hyperlipidemia 07/17/2018     Priority: Medium     Essential hypertension 07/16/2018     Priority: Medium     Morbid obesity due to excess calories (H) 07/16/2018     Priority: Medium     Pre-diabetes 07/16/2018     Priority: Medium        Past Medical History:    Past Medical History:   Diagnosis Date     Mumps without complication        Past Surgical History:    Past Surgical History:   Procedure Laterality Date     APPENDECTOMY OPEN      No Comments Provided       Family History:    Family History   Problem Relation Age of Onset     Other - See Comments Mother         RA     Diabetes Maternal Grandfather         Diabetes     Heart Disease  "Maternal Grandfather         Heart Disease     Cancer Maternal Grandfather         Cancer,pancreatic     Breast Cancer Maternal Grandmother         Cancer-breast     Cancer Paternal Grandmother         Cancer,pancreatic     No Known Problems Father        Social History:  Marital Status:  Single [1]  Social History     Tobacco Use     Smoking status: Never Smoker     Smokeless tobacco: Never Used   Substance Use Topics     Alcohol use: Yes     Comment: on occasion     Drug use: Unknown     Types: Other     Comment: Drug use: No        Medications:    No current outpatient medications on file.        Review of Systems  A 10-point ROS performed and is otherwise negative except as stated in the HPI.    Physical Exam   BP: (!) 150/100  Heart Rate: 81  Temp: 98.5  F (36.9  C)  Resp: 15  Height: 165 cm (5' 4.96\")  SpO2: 99 %      Physical Exam  Vital signs:  Vitals:    05/09/20 2230   BP: (!) 150/100   Resp: 15   Temp: 98.5  F (36.9  C)   TempSrc: Tympanic   SpO2: 99%   Height: 1.65 m (5' 4.96\")       HENT: membranes moist, oropharynx parent  Neck: normal ROM, side bending approximately equal and without exacerbation of pain, no tenderness  Neuro: alert, moves all extremities, strength and sensation normal throughout left upper extremity  Pulm: clear bilaterally, unlabored  Card: regular rate and rhythm, 2+ pulses  Abdomen: Tender epigastrium without guarding, non-distended throughout, no mid or lower abdominal tenderness, no CVA tenderness  MSK: appears atraumatic in extremities, normal range of motion and nontender left upper extremity  Skin: no visualized rashes      ED Course        Procedures               EKG Interpretation:      Interpreted by Adam Arteaga MD  Time reviewed: 23:35  Symptoms at time of EKG: chest pain   Rhythm: normal sinus   Rate: normal  Axis: normal  Ectopy: none  Conduction: normal  ST Segments/ T Waves: No ST-T wave changes  Q Waves: none  Comparison to prior: no concerning " changes  Clinical Impression: normal EKG           Results for orders placed or performed during the hospital encounter of 05/09/20 (from the past 24 hour(s))   CBC with platelets differential   Result Value Ref Range    WBC 7.6 4.0 - 11.0 10e9/L    RBC Count 4.69 4.4 - 5.9 10e12/L    Hemoglobin 14.0 13.3 - 17.7 g/dL    Hematocrit 40.6 40.0 - 53.0 %    MCV 87 78 - 100 fl    MCH 29.9 26.5 - 33.0 pg    MCHC 34.5 31.5 - 36.5 g/dL    RDW 12.4 10.0 - 15.0 %    Platelet Count 284 150 - 450 10e9/L    Diff Method Automated Method     % Neutrophils 31.1 %    % Lymphocytes 54.8 %    % Monocytes 9.1 %    % Eosinophils 4.1 %    % Basophils 0.8 %    % Immature Granulocytes 0.1 %    Absolute Neutrophil 2.4 1.6 - 8.3 10e9/L    Absolute Lymphocytes 4.2 0.8 - 5.3 10e9/L    Absolute Monocytes 0.7 0.0 - 1.3 10e9/L    Absolute Eosinophils 0.3 0.0 - 0.7 10e9/L    Absolute Basophils 0.1 0.0 - 0.2 10e9/L    Abs Immature Granulocytes 0.0 0 - 0.4 10e9/L   Basic metabolic panel   Result Value Ref Range    Sodium 138 134 - 144 mmol/L    Potassium 3.5 3.5 - 5.1 mmol/L    Chloride 104 98 - 107 mmol/L    Carbon Dioxide 25 21 - 31 mmol/L    Anion Gap 9 3 - 14 mmol/L    Glucose 108 (H) 70 - 105 mg/dL    Urea Nitrogen 21 7 - 25 mg/dL    Creatinine 1.02 0.70 - 1.30 mg/dL    GFR Estimate 82 >60 mL/min/[1.73_m2]    GFR Estimate If Black >90 >60 mL/min/[1.73_m2]    Calcium 9.3 8.6 - 10.3 mg/dL   Troponin GH   Result Value Ref Range    Troponin 3.3 <34.0 pg/mL   XR Chest 2 Views    Narrative    PROCEDURE INFORMATION:   Exam: XR Chest, 2 Views   Exam date and time: 5/9/2020 11:31 PM   Age: 39 years old   Clinical indication: Left-sided chest pain     TECHNIQUE:   Imaging protocol: XR of the chest   Views: 2 views.     COMPARISON:   No relevant prior studies available.     FINDINGS:   Tubes, catheters and devices: Overlying EKG leads.   Lungs: No acute focal dense air space consolidation or lung parenchymal mass.   Pleural space: No pneumothorax. No large  right pleural effusion. No large left   pleural effusion.   Heart/Mediastinum: Unremarkable cardiac silhouette. No mediastinal mass.   Bones/joints: No acute fracture or neoplastic osseous lesion.       Impression    IMPRESSION:   1. No active cardiopulmonary disaese.   2. Remainder of findings described as above.     THIS DOCUMENT HAS BEEN ELECTRONICALLY SIGNED BY WILTON MUIR MD   Troponin GH   Result Value Ref Range    Troponin 2.8 <34.0 pg/mL       Medications   nitroGLYcerin (NITROSTAT) sublingual tablet 0.4 mg (has no administration in time range)   aspirin (ASA) chewable tablet 324 mg (324 mg Oral Given 5/9/20 2256)   alum & mag hydroxide-simethicone (MAALOX  ES) suspension 30 mL (30 mLs Oral Given 5/9/20 2257)       Assessments & Plan (with Medical Decision Making)     I have reviewed the nursing notes.    I have reviewed the findings, diagnosis, plan and need for follow up with the patient.  Dontrell Garcia is 39 year old male PMH of hypertension, hyperlipidemia, obesity who presents with chest pain.  Patient was treated with a full dose aspirin, sublingual nitroglycerin, and a GI cocktail.  He is not sure if any of the particular medications helped but on reassessment he is not having any ongoing chest pain.  I used a GI cocktail as he did have reproducible chest pain with epigastric tenderness and I suspect referred esophageal or gastric is highly possible.  Also discussed with him uncertainty of diagnosis but reassuring EKG, troponin, and repeat troponin several hours later combined with HEART score of 3 low risk EDACS low risk making ACS unlikely or at minimum safe to discharge.  He is PERC negative for PE.  His chest x-ray is unrevealing.  Regarding his left arm sensation, he has a normal neurological exam with normal strength and sensation in his numbness appears subjective.  I do not think this is ongoing referred pathology from his heart or any central nervous system process.  I will start him on a  daily baby aspirin until he can be seen on Monday for follow-up appointment and consideration of stress test.  Also encouraged over-the-counter antacids if providing relief for his epigastric pain.  Return precautions discussed at length prior to discharge.    New Prescriptions    No medications on file       Final diagnoses:   Chest pain, unspecified type       5/9/2020   [facility]

## 2020-05-12 LAB — INTERPRETATION ECG - MUSE: NORMAL

## 2021-09-09 ENCOUNTER — OFFICE VISIT (OUTPATIENT)
Dept: FAMILY MEDICINE | Facility: OTHER | Age: 41
End: 2021-09-09
Attending: FAMILY MEDICINE
Payer: COMMERCIAL

## 2021-09-09 VITALS
BODY MASS INDEX: 45.35 KG/M2 | DIASTOLIC BLOOD PRESSURE: 78 MMHG | SYSTOLIC BLOOD PRESSURE: 130 MMHG | TEMPERATURE: 97.3 F | OXYGEN SATURATION: 97 % | WEIGHT: 272.2 LBS | RESPIRATION RATE: 20 BRPM | HEIGHT: 65 IN | HEART RATE: 68 BPM

## 2021-09-09 DIAGNOSIS — M77.11 LATERAL EPICONDYLITIS OF RIGHT ELBOW: Primary | ICD-10-CM

## 2021-09-09 PROCEDURE — 99203 OFFICE O/P NEW LOW 30 MIN: CPT | Performed by: FAMILY MEDICINE

## 2021-09-09 ASSESSMENT — PAIN SCALES - GENERAL: PAINLEVEL: NO PAIN (0)

## 2021-09-09 ASSESSMENT — MIFFLIN-ST. JEOR: SCORE: 2075.95

## 2021-09-09 NOTE — NURSING NOTE
Patient here for right elbow pain for the past 3 weeks. No injury noted. Medication Reconciliation: complete.    Sujata Espinal LPN  9/9/2021 12:57 PM

## 2021-09-10 NOTE — PROGRESS NOTES
"  SUBJECTIVE:   Dontrell Garcia is a 41 year old male who presents to clinic today for the following health issues: Elbow pain    Patient arrives here for elbow pain.  Is been going on for about 3 weeks.  Occurring in his right elbow.  He denies any trauma.  He states squeezing and twisting produces quite a bit of pain and discomfort.            Review of Systems     OBJECTIVE:     /78   Pulse 68   Temp 97.3  F (36.3  C)   Resp 20   Ht 1.65 m (5' 4.96\")   Wt 123.5 kg (272 lb 3.2 oz)   SpO2 97%   BMI 45.35 kg/m    Body mass index is 45.35 kg/m .  Physical Exam  Constitutional:       Appearance: Normal appearance.   Musculoskeletal:         General: Tenderness present. Normal range of motion.      Comments: There is significant tenderness along the lateral epicondyle.  Good range of motion with good extension flexion.   Neurological:      Mental Status: He is alert.             ASSESSMENT/PLAN:         (M77.11) Lateral epicondylitis of right elbow  (primary encounter diagnosis)  Discussed treatment options.  Discussed mechanisms for pain.  We discussed tennis elbow splint.  Outpatient will place it.  Ibuprofen.  Rest.  If no improvement consider physical therapy consult      Festus Candelario MD  [facility]  "

## 2022-06-21 ENCOUNTER — OFFICE VISIT (OUTPATIENT)
Dept: PEDIATRICS | Facility: OTHER | Age: 42
End: 2022-06-21
Attending: INTERNAL MEDICINE
Payer: COMMERCIAL

## 2022-06-21 ENCOUNTER — OFFICE VISIT (OUTPATIENT)
Dept: FAMILY MEDICINE | Facility: OTHER | Age: 42
End: 2022-06-21
Attending: INTERNAL MEDICINE
Payer: COMMERCIAL

## 2022-06-21 ENCOUNTER — HOSPITAL ENCOUNTER (EMERGENCY)
Facility: OTHER | Age: 42
Discharge: HOME | End: 2022-06-21
Attending: PHYSICIAN ASSISTANT | Admitting: PHYSICIAN ASSISTANT
Payer: COMMERCIAL

## 2022-06-21 VITALS
BODY MASS INDEX: 44.2 KG/M2 | HEIGHT: 66 IN | DIASTOLIC BLOOD PRESSURE: 92 MMHG | TEMPERATURE: 98.7 F | WEIGHT: 275 LBS | OXYGEN SATURATION: 97 % | RESPIRATION RATE: 11 BRPM | HEART RATE: 71 BPM | SYSTOLIC BLOOD PRESSURE: 139 MMHG

## 2022-06-21 DIAGNOSIS — Z53.9 ERRONEOUS ENCOUNTER--DISREGARD: Primary | ICD-10-CM

## 2022-06-21 DIAGNOSIS — R07.9 CHEST PAIN: ICD-10-CM

## 2022-06-21 DIAGNOSIS — R53.83 EXHAUSTION: ICD-10-CM

## 2022-06-21 LAB
ANION GAP SERPL CALCULATED.3IONS-SCNC: 7 MMOL/L (ref 3–14)
BASOPHILS # BLD AUTO: 0.1 10E3/UL (ref 0–0.2)
BASOPHILS NFR BLD AUTO: 1 %
BUN SERPL-MCNC: 22 MG/DL (ref 7–25)
CALCIUM SERPL-MCNC: 9.3 MG/DL (ref 8.6–10.3)
CHLORIDE BLD-SCNC: 105 MMOL/L (ref 98–107)
CO2 SERPL-SCNC: 25 MMOL/L (ref 21–31)
CREAT SERPL-MCNC: 0.91 MG/DL (ref 0.7–1.3)
EOSINOPHIL # BLD AUTO: 0.3 10E3/UL (ref 0–0.7)
EOSINOPHIL NFR BLD AUTO: 5 %
ERYTHROCYTE [DISTWIDTH] IN BLOOD BY AUTOMATED COUNT: 12.9 % (ref 10–15)
GFR SERPL CREATININE-BSD FRML MDRD: >90 ML/MIN/1.73M2
GLUCOSE BLD-MCNC: 90 MG/DL (ref 70–105)
HCT VFR BLD AUTO: 40.9 % (ref 40–53)
HGB BLD-MCNC: 14.1 G/DL (ref 13.3–17.7)
IMM GRANULOCYTES # BLD: 0 10E3/UL
IMM GRANULOCYTES NFR BLD: 0 %
LYMPHOCYTES # BLD AUTO: 3.2 10E3/UL (ref 0.8–5.3)
LYMPHOCYTES NFR BLD AUTO: 48 %
MCH RBC QN AUTO: 30.4 PG (ref 26.5–33)
MCHC RBC AUTO-ENTMCNC: 34.5 G/DL (ref 31.5–36.5)
MCV RBC AUTO: 88 FL (ref 78–100)
MONOCYTES # BLD AUTO: 0.7 10E3/UL (ref 0–1.3)
MONOCYTES NFR BLD AUTO: 11 %
NEUTROPHILS # BLD AUTO: 2.3 10E3/UL (ref 1.6–8.3)
NEUTROPHILS NFR BLD AUTO: 35 %
NRBC # BLD AUTO: 0 10E3/UL
NRBC BLD AUTO-RTO: 0 /100
PLATELET # BLD AUTO: 280 10E3/UL (ref 150–450)
POTASSIUM BLD-SCNC: 4 MMOL/L (ref 3.5–5.1)
RBC # BLD AUTO: 4.64 10E6/UL (ref 4.4–5.9)
SODIUM SERPL-SCNC: 137 MMOL/L (ref 134–144)
TROPONIN I SERPL-MCNC: 2.8 PG/ML (ref 0–34)
WBC # BLD AUTO: 6.6 10E3/UL (ref 4–11)

## 2022-06-21 PROCEDURE — 99284 EMERGENCY DEPT VISIT MOD MDM: CPT | Performed by: PHYSICIAN ASSISTANT

## 2022-06-21 PROCEDURE — 80048 BASIC METABOLIC PNL TOTAL CA: CPT | Performed by: PHYSICIAN ASSISTANT

## 2022-06-21 PROCEDURE — 36415 COLL VENOUS BLD VENIPUNCTURE: CPT | Performed by: PHYSICIAN ASSISTANT

## 2022-06-21 PROCEDURE — 85025 COMPLETE CBC W/AUTO DIFF WBC: CPT | Performed by: PHYSICIAN ASSISTANT

## 2022-06-21 PROCEDURE — 93005 ELECTROCARDIOGRAM TRACING: CPT | Performed by: PHYSICIAN ASSISTANT

## 2022-06-21 PROCEDURE — 93010 ELECTROCARDIOGRAM REPORT: CPT | Performed by: INTERNAL MEDICINE

## 2022-06-21 PROCEDURE — 99283 EMERGENCY DEPT VISIT LOW MDM: CPT | Performed by: PHYSICIAN ASSISTANT

## 2022-06-21 PROCEDURE — 84484 ASSAY OF TROPONIN QUANT: CPT | Performed by: PHYSICIAN ASSISTANT

## 2022-06-21 ASSESSMENT — ENCOUNTER SYMPTOMS
ABDOMINAL PAIN: 0
CONFUSION: 0
SHORTNESS OF BREATH: 0
FEVER: 0
VOMITING: 0
FATIGUE: 1
NAUSEA: 0
DYSURIA: 0

## 2022-06-21 NOTE — PROGRESS NOTES
Patient presented to the rapid clinic today to be seen for a doctor's note.  He states he is not feeling well and needed a note so he does not have to go to work the next couple days.  It does appear that he presented to the clinic to see internal medicine provider today.  Nurses note at 1254 today states patient arrived with active chest pain that radiated into the left side of his neck with a burning and hurting sensation along with dizziness.  He said yesterday was on the left arm.  The nurse did check with Dr. Moise and per protocol active chest pain goes emergency room to be evaluated.  Patient refused and walked out because he did not want to pay for it.  I did talk to patient today regarding these concerns and stated that he should be presenting to the emergency room.  He states he is no longer having chest pain but he does not feel well.  I did explain to him once again in the presence of the rapid clinic nurse and  that due to the fact that he had chest pain this morning and he is still not feeling well, I cannot evaluate him in the rapid clinic or provide a note so he does not need to go to work.  It is highly encouraged that he will be emergency room for further evaluation.  This was reiterated to him in multiple ways as he stated he did not understand why he cannot be seen.  Ultimately, he did leave stating he was going to go to the emergency room. RUDY Vila CNP on 6/21/2022 at 2:45 PM    This encounter was opened in error. Please disregard.

## 2022-06-21 NOTE — NURSING NOTE
Patient arrives with active chest pain that radiates up the left side of the neck with a burning and hurting sensation along with dizziness. He said yesterday it was down the left arm. I did check with  and per protocol active chest pain  goes to the ER to be evaluated. Patient refused and walked out because he did not want to pay for it. Medication Reconciliation: complete.    Sujata Espinal LPN  6/21/2022 12:54 PM

## 2022-06-21 NOTE — LETTER
June 21, 2022      To Whom It May Concern:      Dontrell Garcia was seen in our Emergency Department today, 06/21/22.  I expect his condition to improve over the next 3 days.  He may return to work and perform activity as tolerated when improved.    Sincerely,        HIRO Cerna

## 2022-06-21 NOTE — ED TRIAGE NOTES
"Pt states still feels crummy from yesterday. Pt states had dizziness, Intermittent chest pain, L shoulder pain continues from yesterday. Ambulance was called from work. Pt declined to be seen now comes in today \"feeling not normal\"  VSS on RA. Ambulated to room, drove from home.     Triage Assessment     Row Name 06/21/22 1286       Triage Assessment (Adult)    Airway WDL WDL       Respiratory WDL    Respiratory WDL WDL       Skin Circulation/Temperature WDL    Skin Circulation/Temperature WDL WDL       Cardiac WDL    Cardiac WDL WDL       Peripheral/Neurovascular WDL    Peripheral Neurovascular WDL WDL       Cognitive/Neuro/Behavioral WDL    Cognitive/Neuro/Behavioral WDL WDL       [place] Coma Scale    Best Eye Response 4-->(E4) spontaneous    Best Motor Response 6-->(M6) obeys commands    Best Verbal Response 5-->(V5) oriented    [place] Coma Scale Score 15              "

## 2022-06-21 NOTE — ED TRIAGE NOTES
"ED Nursing Triage Note (General)   ________________________________    Dontrell Garcia is a 41 year old Male that presents to triage private car  With history of having an episode of near syncope and chest pain he believed to be related to dehydration and working in hot environment or heat stroke today would have to return to work but needs a note reported by patient   Significant symptoms had onset 24 hour(s) ago.  /77   Pulse 78   Temp 98.7  F (37.1  C) (Temporal)   Resp 15   Ht 1.67 m (5' 5.75\")   Wt 124.7 kg (275 lb)   BMI 44.73 kg/m  t  Patient appears alert , in moderate distress., and cooperative behavior.    GCS Total = 15  Airway: intact  Breathing noted as Normal  Circulation Normal  Skin:  Normal  Action taken: to room      [facility] HOSPITAL PRIOR LIVING SITUATION Spouse and Children      "

## 2022-06-21 NOTE — DISCHARGE INSTRUCTIONS
Get plenty of fluids and rest.  As discussed, all of your studies at this point appear very well.  We we did discuss that usually we will obtain further studies to try her best and rule out any cardiac damage or other emergent process occurring however he did decline further studies today.  I did place referral for you to follow-up with PCP for reassessment.  Please return the ED if you are having any worsening or concerning symptoms.

## 2022-06-22 NOTE — ED PROVIDER NOTES
"  History     Chief Complaint   Patient presents with     Dizziness     lethargy     HPI  Dontrell A Radha is a 41 year old male who presents to the ED for evaluation of generalized weakness and chest pain. Pt states still feels crummy from yesterday. Pt states had dizziness, Intermittent chest pain, L shoulder pain continues from yesterday. Ambulance was called from work. Pt declined to be seen now comes in today \"feeling not normal\". When I see him in the ED he denies chest pain.  In general he says he just needs a note for work.     Allergies:  Allergies   Allergen Reactions     Bee Venom Rash       Problem List:    Patient Active Problem List    Diagnosis Date Noted     Hypertriglyceridemia 07/17/2018     Priority: Medium     Mixed hyperlipidemia 07/17/2018     Priority: Medium     Essential hypertension 07/16/2018     Priority: Medium     Morbid obesity due to excess calories (H) 07/16/2018     Priority: Medium     Pre-diabetes 07/16/2018     Priority: Medium        Past Medical History:    Past Medical History:   Diagnosis Date     Mumps without complication        Past Surgical History:    Past Surgical History:   Procedure Laterality Date     APPENDECTOMY OPEN      No Comments Provided       Family History:    Family History   Problem Relation Age of Onset     Other - See Comments Mother         RA     Diabetes Maternal Grandfather         Diabetes     Heart Disease Maternal Grandfather         Heart Disease     Cancer Maternal Grandfather         Cancer,pancreatic     Breast Cancer Maternal Grandmother         Cancer-breast     Cancer Paternal Grandmother         Cancer,pancreatic     No Known Problems Father        Social History:  Marital Status:   [2]  Social History     Tobacco Use     Smoking status: Never Smoker     Smokeless tobacco: Never Used   Vaping Use     Vaping Use: Never used   Substance Use Topics     Alcohol use: Not Currently     Comment: on occasion     Drug use: Not Currently     " "Comment: Drug use: No        Medications:    No current outpatient medications on file.        Review of Systems   Constitutional: Positive for fatigue. Negative for fever.   HENT: Negative for congestion.    Eyes: Negative for visual disturbance.   Respiratory: Negative for shortness of breath.    Cardiovascular: Negative for chest pain.   Gastrointestinal: Negative for abdominal pain, nausea and vomiting.   Genitourinary: Negative for dysuria.   Psychiatric/Behavioral: Negative for confusion.       Physical Exam   BP: 129/77  Pulse: 78  Temp: 98.7  F (37.1  C)  Resp: 15  Height: 167 cm (5' 5.75\")  Weight: 124.7 kg (275 lb)  SpO2: 96 %  Lying Orthostatic BP: 122/79  Lying Orthostatic Pulse: 72 bpm  Sitting Orthostatic BP: 125/79  Sitting Orthostatic Pulse: 76 bpm  Standing Orthostatic BP: 117/85  Standing Orthostatic Pulse: 80 bpm      Physical Exam  Constitutional:       General: He is not in acute distress.     Appearance: He is well-developed. He is not diaphoretic.   HENT:      Head: Normocephalic and atraumatic.   Eyes:      General: No scleral icterus.     Conjunctiva/sclera: Conjunctivae normal.   Cardiovascular:      Rate and Rhythm: Normal rate and regular rhythm.   Pulmonary:      Effort: Pulmonary effort is normal.      Breath sounds: Normal breath sounds.   Abdominal:      Palpations: Abdomen is soft.      Tenderness: There is no abdominal tenderness.   Musculoskeletal:         General: No deformity.      Cervical back: Neck supple.   Lymphadenopathy:      Cervical: No cervical adenopathy.   Skin:     General: Skin is warm and dry.      Coloration: Skin is not jaundiced.      Findings: No rash.   Neurological:      General: No focal deficit present.      Mental Status: He is alert and oriented to person, place, and time. Mental status is at baseline.      Cranial Nerves: No cranial nerve deficit.      Sensory: No sensory deficit.      Motor: No weakness.      Coordination: Coordination normal. "   Psychiatric:         Mood and Affect: Mood normal.         Behavior: Behavior normal.         Thought Content: Thought content normal.         Judgment: Judgment normal.         ED Course                 Procedures         EKG read at 1503. HR 70, NSR, no ST changes.     Critical Care time:  none               Results for orders placed or performed during the hospital encounter of 06/21/22 (from the past 24 hour(s))   CBC with platelets differential    Narrative    The following orders were created for panel order CBC with platelets differential.  Procedure                               Abnormality         Status                     ---------                               -----------         ------                     CBC with platelets and d...[[id number]]                      Final result                 Please view results for these tests on the individual orders.   Basic metabolic panel   Result Value Ref Range    Sodium 137 134 - 144 mmol/L    Potassium 4.0 3.5 - 5.1 mmol/L    Chloride 105 98 - 107 mmol/L    Carbon Dioxide (CO2) 25 21 - 31 mmol/L    Anion Gap 7 3 - 14 mmol/L    Urea Nitrogen 22 7 - 25 mg/dL    Creatinine 0.91 0.70 - 1.30 mg/dL    Calcium 9.3 8.6 - 10.3 mg/dL    Glucose 90 70 - 105 mg/dL    GFR Estimate >90 >60 mL/min/1.73m2   CBC with platelets and differential   Result Value Ref Range    WBC Count 6.6 4.0 - 11.0 10e3/uL    RBC Count 4.64 4.40 - 5.90 10e6/uL    Hemoglobin 14.1 13.3 - 17.7 g/dL    Hematocrit 40.9 40.0 - 53.0 %    MCV 88 78 - 100 fL    MCH 30.4 26.5 - 33.0 pg    MCHC 34.5 31.5 - 36.5 g/dL    RDW 12.9 10.0 - 15.0 %    Platelet Count 280 150 - 450 10e3/uL    % Neutrophils 35 %    % Lymphocytes 48 %    % Monocytes 11 %    % Eosinophils 5 %    % Basophils 1 %    % Immature Granulocytes 0 %    NRBCs per 100 WBC 0 <1 /100    Absolute Neutrophils 2.3 1.6 - 8.3 10e3/uL    Absolute Lymphocytes 3.2 0.8 - 5.3 10e3/uL    Absolute Monocytes 0.7 0.0 - 1.3 10e3/uL    Absolute Eosinophils 0.3  0.0 - 0.7 10e3/uL    Absolute Basophils 0.1 0.0 - 0.2 10e3/uL    Absolute Immature Granulocytes 0.0 <=0.4 10e3/uL    Absolute NRBCs 0.0 10e3/uL   Troponin I (now)   Result Value Ref Range    Troponin I 2.8 0.0 - 34.0 pg/mL       Medications - No data to display    Assessments & Plan (with Medical Decision Making)   Pt nontoxic in NAD. Heart, lung, bowel sounds normal, abd soft, nontender to palpation, nondistended. VSS, afebrile.     EKG appears well, he has no leukocytosis, normal hemoglobin, negative troponin and normal BMP.    I discussed with him that obtaining further studies including a delta troponin, imaging amongst others would be the usual standard of care to do the best we can at ruling out an emergent process.  He declined any further studies at this time and said that he feels comfortable going home.  He is aware that without obtaining further studies there could be an emergent process occurring that I am unable to identify.    I did place referral for him to follow-up with PCP for reassessment and I gave him a note for work to take a day off for further rest and close observation.    Strict return precautions are given to the pt, they will return if symptoms are worsening or concerning. The pt understands and agrees with the plan and they are discharged.     Jignesh Lopez PA-C    I have reviewed the nursing notes.    I have reviewed the findings, diagnosis, plan and need for follow up with the patient.       There are no discharge medications for this patient.      Final diagnoses:   Chest pain   Exhaustion       6/21/2022   [facility] Jignesh Colunga PA  06/21/22 1945

## 2022-06-26 ENCOUNTER — HEALTH MAINTENANCE LETTER (OUTPATIENT)
Age: 42
End: 2022-06-26

## 2022-06-29 ENCOUNTER — OFFICE VISIT (OUTPATIENT)
Dept: PEDIATRICS | Facility: OTHER | Age: 42
End: 2022-06-29
Attending: INTERNAL MEDICINE
Payer: COMMERCIAL

## 2022-06-29 VITALS
WEIGHT: 284.1 LBS | BODY MASS INDEX: 46.21 KG/M2 | HEART RATE: 80 BPM | TEMPERATURE: 97.2 F | DIASTOLIC BLOOD PRESSURE: 68 MMHG | SYSTOLIC BLOOD PRESSURE: 126 MMHG | RESPIRATION RATE: 13 BRPM | OXYGEN SATURATION: 96 %

## 2022-06-29 DIAGNOSIS — R07.89 ATYPICAL CHEST PAIN: Primary | ICD-10-CM

## 2022-06-29 PROCEDURE — 99213 OFFICE O/P EST LOW 20 MIN: CPT | Performed by: INTERNAL MEDICINE

## 2022-06-29 ASSESSMENT — PAIN SCALES - GENERAL: PAINLEVEL: NO PAIN (0)

## 2022-06-29 NOTE — NURSING NOTE
Chief Complaint   Patient presents with     RECHECK     ER Visit, Wants to be cleared to return to work          Medication Reconciliation: kristel Tijerina

## 2022-06-29 NOTE — LETTER
June 29, 2022      Dontrell Garcia  [address] [facility] [address]        To whomever it may concern:    Dontrell Garcia was seen in my clinic today 6/29/2022.  He should return to work on 7/5/22.    Signed, Kody Moise MD, FAAP, FACP  Internal Medicine & Pediatrics

## 2022-06-29 NOTE — PROGRESS NOTES
Assessment & Plan   1. Atypical chest pain  Differential diagnosis includes esophageal spasm, gastroesophageal reflux disease, pleurisy, costochondritis, atypical angina, cervical radiculopathy, anxiety or panic attack, others.  We had a lengthy discussion today with regards to chest pains.  A letter was written today.    Signed, Kody Moise MD, FAAP, FACP  Internal Medicine & Pediatrics    Subjective   Dontrell Garcia is a 41 year old male who presents for ER follow-up.  He recently developed some chest pain.  He came to my clinic but I was sick that day and he was brought to the ER.  He was describing left-sided chest pain with radiation to the left arm and neck.  No pain in the back of the neck.  Seems to occur with rest and never with exertion.  Described as an ache to sharp sensation.  No known alleviating or exacerbating features.  No dyspnea.  He underwent a thorough evaluation in the emergency department and was told he was dehydrated.  He was given hydration and he feels like this helps to resolve his symptoms.  No significant heartburn.  He would like to go back to work when it is safe to do so.    Objective   Vitals: /68   Pulse 80   Temp 97.2  F (36.2  C) (Tympanic)   Resp 13   Wt 128.9 kg (284 lb 1.6 oz)   SpO2 96%   BMI 46.21 kg/m      General: well appearing  CV: Regular rate and rhythm, no murmur, rub or gallop  Pulm: Clear to auscultation bilaterally, no wheezing, rales or rhonchi  Neuro: Grossly intact  Musculoskeletal: No lower extremity edema  Skin: No rash  Psychiatry: Normal affect and insight.    Review and Analysis of Data   I personally reviewed the following:  External notes: No  Results: Yes ER lab reviewed  Use of an independent historian: No  Independent review of a test performed by another physician: No  Discussion of management with another physician: No  Low risk of morbidity from additional diagnostic testing and/or treatment.

## 2022-12-26 ENCOUNTER — HEALTH MAINTENANCE LETTER (OUTPATIENT)
Age: 42
End: 2022-12-26

## 2023-03-02 ENCOUNTER — HOSPITAL ENCOUNTER (OUTPATIENT)
Dept: GENERAL RADIOLOGY | Facility: OTHER | Age: 43
Discharge: HOME | End: 2023-03-02
Attending: FAMILY MEDICINE
Payer: COMMERCIAL

## 2023-03-02 ENCOUNTER — OFFICE VISIT (OUTPATIENT)
Dept: FAMILY MEDICINE | Facility: OTHER | Age: 43
End: 2023-03-02
Attending: FAMILY MEDICINE
Payer: COMMERCIAL

## 2023-03-02 VITALS
HEART RATE: 86 BPM | HEIGHT: 66 IN | OXYGEN SATURATION: 96 % | BODY MASS INDEX: 46.96 KG/M2 | SYSTOLIC BLOOD PRESSURE: 132 MMHG | WEIGHT: 292.2 LBS | DIASTOLIC BLOOD PRESSURE: 88 MMHG | RESPIRATION RATE: 20 BRPM | TEMPERATURE: 96.8 F

## 2023-03-02 DIAGNOSIS — G89.29 CHRONIC PAIN OF BOTH SHOULDERS: ICD-10-CM

## 2023-03-02 DIAGNOSIS — T63.444D BEE STING REACTION, UNDETERMINED INTENT, SUBSEQUENT ENCOUNTER: ICD-10-CM

## 2023-03-02 DIAGNOSIS — Z02.89 HEALTH EXAMINATION OF DEFINED SUBPOPULATION: ICD-10-CM

## 2023-03-02 DIAGNOSIS — E66.01 MORBID OBESITY DUE TO EXCESS CALORIES (H): ICD-10-CM

## 2023-03-02 DIAGNOSIS — M25.511 CHRONIC PAIN OF BOTH SHOULDERS: ICD-10-CM

## 2023-03-02 DIAGNOSIS — M25.512 CHRONIC PAIN OF BOTH SHOULDERS: ICD-10-CM

## 2023-03-02 DIAGNOSIS — Z02.89 HEALTH EXAMINATION OF DEFINED SUBPOPULATION: Primary | ICD-10-CM

## 2023-03-02 LAB
ALBUMIN SERPL BCG-MCNC: 4.5 G/DL (ref 3.5–5.2)
ALP SERPL-CCNC: 69 U/L (ref 40–129)
ALT SERPL W P-5'-P-CCNC: 44 U/L (ref 10–50)
ANION GAP SERPL CALCULATED.3IONS-SCNC: 8 MMOL/L (ref 7–15)
AST SERPL W P-5'-P-CCNC: 29 U/L (ref 10–50)
BILIRUB SERPL-MCNC: 0.6 MG/DL
BUN SERPL-MCNC: 15.8 MG/DL (ref 6–20)
CALCIUM SERPL-MCNC: 9.2 MG/DL (ref 8.6–10)
CHLORIDE SERPL-SCNC: 104 MMOL/L (ref 98–107)
CHOLEST SERPL-MCNC: 248 MG/DL
CREAT SERPL-MCNC: 0.82 MG/DL (ref 0.67–1.17)
DEPRECATED HCO3 PLAS-SCNC: 27 MMOL/L (ref 22–29)
GFR SERPL CREATININE-BSD FRML MDRD: >90 ML/MIN/1.73M2
GLUCOSE SERPL-MCNC: 100 MG/DL (ref 70–99)
HBA1C MFR BLD: 5.6 % (ref 4–6.2)
HDLC SERPL-MCNC: 49 MG/DL
HOLD SPECIMEN: NORMAL
LDLC SERPL CALC-MCNC: 166 MG/DL
NONHDLC SERPL-MCNC: 199 MG/DL
POTASSIUM SERPL-SCNC: 4.1 MMOL/L (ref 3.4–5.3)
PROT SERPL-MCNC: 7.2 G/DL (ref 6.4–8.3)
SODIUM SERPL-SCNC: 139 MMOL/L (ref 136–145)
TRIGL SERPL-MCNC: 167 MG/DL
TSH SERPL DL<=0.005 MIU/L-ACNC: 1.77 UIU/ML (ref 0.3–4.2)

## 2023-03-02 PROCEDURE — 36415 COLL VENOUS BLD VENIPUNCTURE: CPT | Mod: ZL | Performed by: FAMILY MEDICINE

## 2023-03-02 PROCEDURE — 80061 LIPID PANEL: CPT | Mod: ZL | Performed by: FAMILY MEDICINE

## 2023-03-02 PROCEDURE — 99396 PREV VISIT EST AGE 40-64: CPT | Performed by: FAMILY MEDICINE

## 2023-03-02 PROCEDURE — 83036 HEMOGLOBIN GLYCOSYLATED A1C: CPT | Mod: ZL | Performed by: FAMILY MEDICINE

## 2023-03-02 PROCEDURE — 84443 ASSAY THYROID STIM HORMONE: CPT | Mod: ZL | Performed by: FAMILY MEDICINE

## 2023-03-02 PROCEDURE — 73030 X-RAY EXAM OF SHOULDER: CPT | Mod: LT

## 2023-03-02 PROCEDURE — 80053 COMPREHEN METABOLIC PANEL: CPT | Mod: ZL | Performed by: FAMILY MEDICINE

## 2023-03-02 RX ORDER — MELOXICAM 15 MG/1
15 TABLET ORAL DAILY
Qty: 30 TABLET | Refills: 4 | Status: SHIPPED | OUTPATIENT
Start: 2023-03-02

## 2023-03-02 ASSESSMENT — ENCOUNTER SYMPTOMS
ARTHRALGIAS: 1
DYSURIA: 0
FEVER: 0
NERVOUS/ANXIOUS: 0
HEMATOCHEZIA: 0
COUGH: 0
MYALGIAS: 0
CHILLS: 0
CONSTIPATION: 0
PALPITATIONS: 0
WEAKNESS: 0
SHORTNESS OF BREATH: 0
ABDOMINAL PAIN: 0
PARESTHESIAS: 0
DIARRHEA: 0
DIZZINESS: 0
SORE THROAT: 0
HEMATURIA: 0
EYE PAIN: 0
NAUSEA: 0
HEARTBURN: 0
FREQUENCY: 0
HEADACHES: 0
JOINT SWELLING: 0

## 2023-03-02 ASSESSMENT — PAIN SCALES - GENERAL: PAINLEVEL: MODERATE PAIN (4)

## 2023-03-02 NOTE — PROGRESS NOTES
"  SUBJECTIVE:   Dontrell Garcia is a 42 year old male who presents to clinic today for the following health issues: Physical  The 10-year ASCVD risk score (Christina COSTA, et al., 2019) is: 3.4%    Values used to calculate the score:      Age: 42 years      Sex: Male      Is Non- : No      Diabetic: No      Tobacco smoker: No      Systolic Blood Pressure: 144 mmHg      Is BP treated: No      HDL Cholesterol: 39 mg/dL      Total Cholesterol: 265 mg/dL      Patient arrives here for physical.  His only complaint this visit is shoulder pain.  He reports his left is worse than the right.  States it started after he had fallen about 2 months ago downhill skiing.  Sleeping will make it worse.  He has not tried any medications except for Tylenol and over-the-counter ibuprofen.  Review of the chart shows he has a quite elevated triglycerides in the past.    Healthy Habits:     Getting at least 3 servings of Calcium per day:  Yes    Bi-annual eye exam:  Yes    Dental care twice a year:  Yes    Sleep apnea or symptoms of sleep apnea:  None    Diet:  Regular (no restrictions)    Frequency of exercise:  6-7 days/week    Duration of exercise:  45-60 minutes    Taking medications regularly:  Yes    Medication side effects:  Not applicable    PHQ-2 Total Score: 0    Additional concerns today:  No        Patient Active Problem List    Diagnosis Date Noted     Hypertriglyceridemia 07/17/2018     Priority: Medium     Mixed hyperlipidemia 07/17/2018     Priority: Medium     Essential hypertension 07/16/2018     Priority: Medium     Morbid obesity due to excess calories (H) 07/16/2018     Priority: Medium     Pre-diabetes 07/16/2018     Priority: Medium       Review of Systems     OBJECTIVE:     BP (!) 144/100   Pulse 86   Temp 96.8  F (36  C)   Resp 20   Ht 1.67 m (5' 5.75\")   Wt 132.5 kg (292 lb 3.2 oz)   SpO2 96%   BMI 47.52 kg/m    Body mass index is 47.52 kg/m .  Physical Exam  Constitutional:       " Appearance: Normal appearance.   HENT:      Head: Normocephalic.      Right Ear: Tympanic membrane normal.      Left Ear: Tympanic membrane normal.      Mouth/Throat:      Mouth: Mucous membranes are moist.   Eyes:      Pupils: Pupils are equal, round, and reactive to light.   Cardiovascular:      Rate and Rhythm: Normal rate.   Pulmonary:      Effort: Pulmonary effort is normal.      Breath sounds: Normal breath sounds.   Abdominal:      General: Abdomen is flat.   Musculoskeletal:         General: Normal range of motion.      Comments: Patient has good range of motion to his shoulders abduction and internal/external rotation.   Neurological:      Mental Status: He is alert.   Psychiatric:         Mood and Affect: Mood normal.         Diagnostic Test Results:  Results for orders placed or performed in visit on 03/02/23 (from the past 24 hour(s))   Lipid Panel   Result Value Ref Range    Cholesterol 248 (H) <200 mg/dL    Triglycerides 167 (H) <150 mg/dL    Direct Measure HDL 49 >=40 mg/dL    LDL Cholesterol Calculated 166 (H) <=100 mg/dL    Non HDL Cholesterol 199 (H) <130 mg/dL    Narrative    Cholesterol  Desirable:  <200 mg/dL    Triglycerides  Normal:  Less than 150 mg/dL  Borderline High:  150-199 mg/dL  High:  200-499 mg/dL  Very High:  Greater than or equal to 500 mg/dL    Direct Measure HDL  Female:  Greater than or equal to 50 mg/dL   Male:  Greater than or equal to 40 mg/dL    LDL Cholesterol  Desirable:  <100mg/dL  Above Desirable:  100-129 mg/dL   Borderline High:  130-159 mg/dL   High:  160-189 mg/dL   Very High:  >= 190 mg/dL    Non HDL Cholesterol  Desirable:  130 mg/dL  Above Desirable:  130-159 mg/dL  Borderline High:  160-189 mg/dL  High:  190-219 mg/dL  Very High:  Greater than or equal to 220 mg/dL   Comprehensive Metabolic Panel   Result Value Ref Range    Sodium 139 136 - 145 mmol/L    Potassium 4.1 3.4 - 5.3 mmol/L    Chloride 104 98 - 107 mmol/L    Carbon Dioxide (CO2) 27 22 - 29 mmol/L     Anion Gap 8 7 - 15 mmol/L    Urea Nitrogen 15.8 6.0 - 20.0 mg/dL    Creatinine 0.82 0.67 - 1.17 mg/dL    Calcium 9.2 8.6 - 10.0 mg/dL    Glucose 100 (H) 70 - 99 mg/dL    Alkaline Phosphatase 69 40 - 129 U/L    AST 29 10 - 50 U/L    ALT 44 10 - 50 U/L    Protein Total 7.2 6.4 - 8.3 g/dL    Albumin 4.5 3.5 - 5.2 g/dL    Bilirubin Total 0.6 <=1.2 mg/dL    GFR Estimate >90 >60 mL/min/1.73m2   Hemoglobin A1c   Result Value Ref Range    Hemoglobin A1C 5.6 4.0 - 6.2 %   TSH Reflex GH   Result Value Ref Range    TSH 1.77 0.30 - 4.20 uIU/mL   Extra Tube    Narrative    The following orders were created for panel order Extra Tube.  Procedure                               Abnormality         Status                     ---------                               -----------         ------                     Extra Serum Separator Tu...[[id number]]                      Final result                 Please view results for these tests on the individual orders.   Extra Serum Separator Tube (SST)   Result Value Ref Range    Hold Specimen JI        ASSESSMENT/PLAN:         (Z02.89) Health examination of defined subpopulation  (primary encounter diagnosis)  Comment:   Plan: Lipid Panel, Comprehensive Metabolic Panel,         Hemoglobin A1c, XR Shoulder Left G/E 3 Views,         meloxicam (MOBIC) 15 MG tablet  The 10-year ASCVD risk score (Christina COSTA, et al., 2019) is: 1.9%    Values used to calculate the score:      Age: 42 years      Sex: Male      Is Non- : No      Diabetic: No      Tobacco smoker: No      Systolic Blood Pressure: 132 mmHg      Is BP treated: No      HDL Cholesterol: 49 mg/dL      Total Cholesterol: 248 mg/dL  Very low cardiac risk score.         (M25.511,  G89.29,  M25.512) Chronic pain of both shoulders  Comment:   Plan: Start Mobic.  If patient does not get good results consider physical therapy or imaging.    (E66.01) Morbid obesity due to excess calories (H)  Comment: Thyroid  satisfactory  Plan: TSH Reflex GH                Festus Candelario MD  [facility]

## 2023-03-02 NOTE — NURSING NOTE
Patient here for yearly physical including joint pain. Bilateral shoulder pain and right knee pain. Medication Reconciliation: complete.    Sujata Espinal LPN  3/2/2023 9:37 AM

## 2023-04-13 RX ORDER — EPINEPHRINE 0.3 MG/.3ML
0.3 INJECTION SUBCUTANEOUS PRN
Qty: 2 EACH | Refills: 1 | Status: SHIPPED | OUTPATIENT
Start: 2023-04-13

## 2023-05-23 ENCOUNTER — OFFICE VISIT (OUTPATIENT)
Dept: FAMILY MEDICINE | Facility: OTHER | Age: 43
End: 2023-05-23
Attending: NURSE PRACTITIONER
Payer: COMMERCIAL

## 2023-05-23 VITALS
TEMPERATURE: 99.1 F | HEART RATE: 80 BPM | HEIGHT: 67 IN | SYSTOLIC BLOOD PRESSURE: 125 MMHG | BODY MASS INDEX: 45 KG/M2 | RESPIRATION RATE: 16 BRPM | OXYGEN SATURATION: 95 % | DIASTOLIC BLOOD PRESSURE: 79 MMHG | WEIGHT: 286.7 LBS

## 2023-05-23 DIAGNOSIS — K64.9 ACUTE HEMORRHOID: ICD-10-CM

## 2023-05-23 DIAGNOSIS — K62.5 RECTAL BLEEDING: Primary | ICD-10-CM

## 2023-05-23 LAB
ALBUMIN SERPL BCG-MCNC: 4.9 G/DL (ref 3.5–5.2)
ALP SERPL-CCNC: 76 U/L (ref 40–129)
ALT SERPL W P-5'-P-CCNC: 45 U/L (ref 10–50)
ANION GAP SERPL CALCULATED.3IONS-SCNC: 11 MMOL/L (ref 7–15)
AST SERPL W P-5'-P-CCNC: 28 U/L (ref 10–50)
BASOPHILS # BLD AUTO: 0.1 10E3/UL (ref 0–0.2)
BASOPHILS NFR BLD AUTO: 1 %
BILIRUB SERPL-MCNC: 0.5 MG/DL
BUN SERPL-MCNC: 19.6 MG/DL (ref 6–20)
CALCIUM SERPL-MCNC: 9.4 MG/DL (ref 8.6–10)
CHLORIDE SERPL-SCNC: 104 MMOL/L (ref 98–107)
CREAT SERPL-MCNC: 0.97 MG/DL (ref 0.67–1.17)
DEPRECATED HCO3 PLAS-SCNC: 25 MMOL/L (ref 22–29)
EOSINOPHIL # BLD AUTO: 0.4 10E3/UL (ref 0–0.7)
EOSINOPHIL NFR BLD AUTO: 6 %
ERYTHROCYTE [DISTWIDTH] IN BLOOD BY AUTOMATED COUNT: 13.2 % (ref 10–15)
GFR SERPL CREATININE-BSD FRML MDRD: >90 ML/MIN/1.73M2
GLUCOSE SERPL-MCNC: 96 MG/DL (ref 70–99)
HCT VFR BLD AUTO: 42 % (ref 40–53)
HGB BLD-MCNC: 14.4 G/DL (ref 13.3–17.7)
HOLD SPECIMEN: NORMAL
IMM GRANULOCYTES # BLD: 0 10E3/UL
IMM GRANULOCYTES NFR BLD: 0 %
LYMPHOCYTES # BLD AUTO: 3.1 10E3/UL (ref 0.8–5.3)
LYMPHOCYTES NFR BLD AUTO: 44 %
MCH RBC QN AUTO: 29.8 PG (ref 26.5–33)
MCHC RBC AUTO-ENTMCNC: 34.3 G/DL (ref 31.5–36.5)
MCV RBC AUTO: 87 FL (ref 78–100)
MONOCYTES # BLD AUTO: 0.8 10E3/UL (ref 0–1.3)
MONOCYTES NFR BLD AUTO: 11 %
NEUTROPHILS # BLD AUTO: 2.7 10E3/UL (ref 1.6–8.3)
NEUTROPHILS NFR BLD AUTO: 38 %
NRBC # BLD AUTO: 0 10E3/UL
NRBC BLD AUTO-RTO: 0 /100
PLATELET # BLD AUTO: 293 10E3/UL (ref 150–450)
POTASSIUM SERPL-SCNC: 4 MMOL/L (ref 3.4–5.3)
PROT SERPL-MCNC: 7.6 G/DL (ref 6.4–8.3)
RBC # BLD AUTO: 4.83 10E6/UL (ref 4.4–5.9)
SODIUM SERPL-SCNC: 140 MMOL/L (ref 136–145)
WBC # BLD AUTO: 7 10E3/UL (ref 4–11)

## 2023-05-23 PROCEDURE — 80053 COMPREHEN METABOLIC PANEL: CPT | Mod: ZL

## 2023-05-23 PROCEDURE — 36415 COLL VENOUS BLD VENIPUNCTURE: CPT | Mod: ZL

## 2023-05-23 PROCEDURE — 85025 COMPLETE CBC W/AUTO DIFF WBC: CPT | Mod: ZL

## 2023-05-23 PROCEDURE — 99213 OFFICE O/P EST LOW 20 MIN: CPT

## 2023-05-23 ASSESSMENT — PAIN SCALES - GENERAL: PAINLEVEL: MILD PAIN (2)

## 2023-05-23 NOTE — PROGRESS NOTES
"Chief Complaint   Patient presents with     Rectal Problem     Blood stool     4 days ago noticed bright red blood in stool post having a bowel movement. Having some rectal pain 2/10. Intermittent fiber in diet.     Initial /79 (BP Location: Left arm, Patient Position: Sitting, Cuff Size: Adult Large)   Pulse 80   Temp 99.1  F (37.3  C) (Tympanic)   Resp 16   Ht 1.71 m (5' 7.32\")   Wt 130 kg (286 lb 11.2 oz)   SpO2 95%   BMI 44.47 kg/m   Estimated body mass index is 44.47 kg/m  as calculated from the following:    Height as of this encounter: 1.71 m (5' 7.32\").    Weight as of this encounter: 130 kg (286 lb 11.2 oz).  Meds Reconciled: complete      Sandy Soto RN    FOOD SECURITY SCREENING QUESTIONS  Hunger Vital Signs:  Within the past 12 months we worried whether our food would run out before we got money to buy more. Never  Within the past 12 months the food we bought just didn't last and we didn't have money to get more. Never  Sandy Soto RN 5/23/2023 3:30 PM  "

## 2024-02-14 ENCOUNTER — OFFICE VISIT (OUTPATIENT)
Dept: FAMILY MEDICINE | Facility: OTHER | Age: 44
End: 2024-02-14
Attending: STUDENT IN AN ORGANIZED HEALTH CARE EDUCATION/TRAINING PROGRAM
Payer: COMMERCIAL

## 2024-02-14 VITALS
HEIGHT: 65 IN | DIASTOLIC BLOOD PRESSURE: 78 MMHG | HEART RATE: 78 BPM | RESPIRATION RATE: 16 BRPM | BODY MASS INDEX: 47.98 KG/M2 | OXYGEN SATURATION: 96 % | SYSTOLIC BLOOD PRESSURE: 136 MMHG | TEMPERATURE: 98.1 F | WEIGHT: 288 LBS

## 2024-02-14 DIAGNOSIS — R73.03 PRE-DIABETES: ICD-10-CM

## 2024-02-14 DIAGNOSIS — Z71.3 WEIGHT LOSS COUNSELING, ENCOUNTER FOR: Primary | ICD-10-CM

## 2024-02-14 DIAGNOSIS — E78.2 MIXED HYPERLIPIDEMIA: ICD-10-CM

## 2024-02-14 PROCEDURE — 99214 OFFICE O/P EST MOD 30 MIN: CPT | Performed by: NURSE PRACTITIONER

## 2024-02-14 ASSESSMENT — PAIN SCALES - GENERAL: PAINLEVEL: NO PAIN (0)

## 2024-02-14 NOTE — PROGRESS NOTES
Dontrell Garcia  1980    ASSESSMENT/PLAN:   1. Weight loss counseling, encounter for  with  2. BMI 45.0-49.9, adult (H)  Patient presents to [facility] clinic today to discuss options for prescription weight loss medications.    He has been trying to modify his diet and is physically active at work without any improvement in his weight.   I did recommend trialing my fitness pal althea to track calories and monitor intake.  Goal weight : 200 lbs  He does have history of hyperlipidemia and prediabetes, he is due for fasting lab work and does need follow-up in main clinic for fasting labs.  We discussed medication options for weight loss management including oral and subcutaneous injection options.  He is interested in GLP-1 agonist medications for weight loss management.  He does not have any personal or family history of thyroid cancer/disease or pancreatic disease or cancer.  I reviewed with patient that I would recommend he follow-up in the main clinic with a provider who will be able to follow-up with him regularly regarding weight management and they can discuss medication options at that time.    Patient is also aware he may contact [facility] and schedule an appointment with Dr. Nate Schaffer who specializes in weight management.  Patient is agreeable to plan of care, he is aware that scheduling will call him to schedule this appointment.  I have provided phone number for him to call if he does not hear from them in 2-3 business days.    3. Pre-diabetes  History of prediabetes, hemoglobin A1c 1 year ago was 5.6.  He has not been on any medications for this in the past.  He is due for lab work and will follow-up in main clinic.    4. Mixed hyperlipidemia  Cholesterol panel from last year was elevated, total cholesterol 248, triglycerides 167, , HDL 49.  He is not on medication therapy.  He is due for fasting lab work.  He will follow-up in main clinic.    Patient agrees with plan of care and verbalizes  "understating. AVS printed. Patient education provided verbally and written instructions provided as requested. Patient made aware of emergent signs and symptoms to monitor for and when to seek additional care/follow up.     SUBJECTIVE:   CHIEF COMPLAINT/ REASON FOR VISIT  Patient presents with:  Referral: Weight management     HISTORY OF PRESENT ILLNESS  Dontrell kwon a pleasant 43 year old male presents to [facility] clinic today presents to [facility] clinic today to discuss weight loss management.  He is interested in talking to somebody about weight loss medications.  He has heard about Wegovy and other injectable medications to help aid in weight loss.  He states that he is physically active at work, and works for [employer].  He does try to reduce his calories and eats healthy however has still been unable to lose weight.  He does try to be mindful of carbohydrates.  He states his diet will fluctuate based on his work schedule.    He does not currently take any medications.  He states in the past he has had high blood pressure but was not on medication.  He also has history of high cholesterol and prediabetes.    I have reviewed the nursing notes.  I have reviewed allergies, medication list, problem list, and past medical history.    REVIEW OF SYSTEMS  Review of Systems     VITAL SIGNS  Vitals:    02/14/24 1532   BP: 136/78   BP Location: Right arm   Patient Position: Sitting   Cuff Size: Adult Regular   Pulse: 78   Resp: 16   Temp: 98.1  F (36.7  C)   TempSrc: Tympanic   SpO2: 96%   Weight: 130.6 kg (288 lb)   Height: 1.65 m (5' 4.96\")      Body mass index is 47.98 kg/m .    OBJECTIVE:   PHYSICAL EXAM  Physical Exam  Vitals reviewed.   Constitutional:       Appearance: Normal appearance. He is not ill-appearing or toxic-appearing.   HENT:      Head: Normocephalic and atraumatic.      Nose: Nose normal.   Eyes:      Conjunctiva/sclera: Conjunctivae normal.   Cardiovascular:      Rate and Rhythm: Normal rate and regular " rhythm.      Pulses: Normal pulses.      Heart sounds: Normal heart sounds. No murmur heard.  Pulmonary:      Effort: Pulmonary effort is normal.      Breath sounds: Normal breath sounds. No wheezing or rhonchi.   Neurological:      General: No focal deficit present.      Mental Status: He is alert and oriented to person, place, and time.   Psychiatric:         Mood and Affect: Mood normal.         Behavior: Behavior normal.         Thought Content: Thought content normal.         Judgment: Judgment normal.          DIAGNOSTICS  No results found for any visits on 02/14/24.     Melina Fernandez NP  [facility]

## 2024-02-14 NOTE — NURSING NOTE
"Chief Complaint   Patient presents with    Referral     Weight management     Patient here to discuss weight management today.       Initial Pulse 78   Temp 98.1  F (36.7  C) (Tympanic)   Resp 16   Ht 1.65 m (5' 4.96\")   Wt 130.6 kg (288 lb)   SpO2 96%   BMI 47.98 kg/m   Estimated body mass index is 47.98 kg/m  as calculated from the following:    Height as of this encounter: 1.65 m (5' 4.96\").    Weight as of this encounter: 130.6 kg (288 lb).  Medication Review: complete    The next two questions are to help us understand your food security.  If you are feeling you need any assistance in this area, we have resources available to support you today.          2/14/2024   SDOH- Food Insecurity   Within the past 12 months, did you worry that your food would run out before you got money to buy more? N   Within the past 12 months, did the food you bought just not last and you didn t have money to get more? N         Health Care Directive:  Patient does not have a Health Care Directive or Living Will: Discussed advance care planning with patient; however, patient declined at this time.    Charity Johnston, LPN      "

## 2024-02-14 NOTE — Clinical Note
Patient would like to schedule office visit to discuss weight loss management with provider who will manage weight loss medications.

## 2024-02-28 ENCOUNTER — APPOINTMENT (OUTPATIENT)
Dept: CT IMAGING | Facility: OTHER | Age: 44
End: 2024-02-28
Attending: FAMILY MEDICINE
Payer: OTHER MISCELLANEOUS

## 2024-02-28 ENCOUNTER — HOSPITAL ENCOUNTER (EMERGENCY)
Facility: OTHER | Age: 44
Discharge: HOME | End: 2024-02-28
Attending: FAMILY MEDICINE | Admitting: FAMILY MEDICINE
Payer: OTHER MISCELLANEOUS

## 2024-02-28 VITALS
HEIGHT: 66 IN | HEART RATE: 93 BPM | DIASTOLIC BLOOD PRESSURE: 97 MMHG | OXYGEN SATURATION: 96 % | SYSTOLIC BLOOD PRESSURE: 155 MMHG | TEMPERATURE: 97.8 F | RESPIRATION RATE: 20 BRPM | BODY MASS INDEX: 45 KG/M2 | WEIGHT: 280 LBS

## 2024-02-28 DIAGNOSIS — S09.90XA INJURY OF HEAD, INITIAL ENCOUNTER: ICD-10-CM

## 2024-02-28 PROCEDURE — 250N000011 HC RX IP 250 OP 636: Performed by: FAMILY MEDICINE

## 2024-02-28 PROCEDURE — 72125 CT NECK SPINE W/O DYE: CPT

## 2024-02-28 PROCEDURE — 70450 CT HEAD/BRAIN W/O DYE: CPT

## 2024-02-28 PROCEDURE — 99284 EMERGENCY DEPT VISIT MOD MDM: CPT | Mod: 25

## 2024-02-28 PROCEDURE — 99284 EMERGENCY DEPT VISIT MOD MDM: CPT | Performed by: FAMILY MEDICINE

## 2024-02-28 RX ORDER — ONDANSETRON 4 MG/1
4 TABLET, ORALLY DISINTEGRATING ORAL ONCE
Status: COMPLETED | OUTPATIENT
Start: 2024-02-28 | End: 2024-02-28

## 2024-02-28 RX ORDER — ONDANSETRON 4 MG/1
4 TABLET, ORALLY DISINTEGRATING ORAL EVERY 8 HOURS PRN
Qty: 5 TABLET | Refills: 0 | Status: SHIPPED | OUTPATIENT
Start: 2024-02-28

## 2024-02-28 RX ADMIN — ONDANSETRON 4 MG: 4 TABLET, ORALLY DISINTEGRATING ORAL at 20:40

## 2024-02-28 ASSESSMENT — ENCOUNTER SYMPTOMS
SHORTNESS OF BREATH: 0
DIARRHEA: 0
FATIGUE: 0
CONSTIPATION: 0
FEVER: 0
VOMITING: 0
JOINT SWELLING: 0
NUMBNESS: 0
PHOTOPHOBIA: 1
COUGH: 0
WEAKNESS: 0
SEIZURES: 0
WOUND: 0
CHOKING: 0
HEADACHES: 1
NECK PAIN: 1
EYE PAIN: 0
SPEECH DIFFICULTY: 0
NAUSEA: 1
DIZZINESS: 0
TREMORS: 0

## 2024-02-28 ASSESSMENT — COLUMBIA-SUICIDE SEVERITY RATING SCALE - C-SSRS
1. IN THE PAST MONTH, HAVE YOU WISHED YOU WERE DEAD OR WISHED YOU COULD GO TO SLEEP AND NOT WAKE UP?: NO
2. HAVE YOU ACTUALLY HAD ANY THOUGHTS OF KILLING YOURSELF IN THE PAST MONTH?: NO
6. HAVE YOU EVER DONE ANYTHING, STARTED TO DO ANYTHING, OR PREPARED TO DO ANYTHING TO END YOUR LIFE?: NO

## 2024-02-28 ASSESSMENT — ACTIVITIES OF DAILY LIVING (ADL)
ADLS_ACUITY_SCORE: 33
ADLS_ACUITY_SCORE: 33

## 2024-02-29 NOTE — ED PROVIDER NOTES
History     Chief Complaint   Patient presents with    Head Injury     Work comp     HPI  Dontrell Garcia is a 43 year old male who resents from work for injury to his head.  A part of a wall fell on the top of his head.  He immediately had nausea.  He also has some pain in the back of his neck.  Complains of light sensitivity.  There are no lacerations.  This is a work injury.  He has not yet taken anything for medication and declines medication at this time.  Thinks the piece that fell on his head weight about 15 pounds.  Patient is having light sensitivity and nausea.  Improved with Zofran.  He denies double vision or blurry vision.  No vomiting.    Allergies:  Allergies   Allergen Reactions    Bee Venom Rash       Problem List:    Patient Active Problem List    Diagnosis Date Noted    Hypertriglyceridemia 07/17/2018     Priority: Medium    Mixed hyperlipidemia 07/17/2018     Priority: Medium    Essential hypertension 07/16/2018     Priority: Medium    Morbid obesity due to excess calories (H) 07/16/2018     Priority: Medium    Pre-diabetes 07/16/2018     Priority: Medium        Past Medical History:    Past Medical History:   Diagnosis Date    Mumps without complication        Past Surgical History:    Past Surgical History:   Procedure Laterality Date    APPENDECTOMY OPEN      No Comments Provided       Family History:    Family History   Problem Relation Age of Onset    Other - See Comments Mother         RA    Diabetes Maternal Grandfather         Diabetes    Heart Disease Maternal Grandfather         Heart Disease    Cancer Maternal Grandfather         Cancer,pancreatic    Breast Cancer Maternal Grandmother         Cancer-breast    Cancer Paternal Grandmother         Cancer,pancreatic    No Known Problems Father        Social History:  Marital Status:   [2]  Social History     Tobacco Use    Smoking status: Never    Smokeless tobacco: Never   Vaping Use    Vaping Use: Never used   Substance Use  "Topics    Alcohol use: Not Currently     Comment: on occasion    Drug use: Not Currently     Comment: Drug use: No        Medications:    EPINEPHrine (ANY BX GENERIC EQUIV) 0.3 MG/0.3ML injection 2-pack  meloxicam (MOBIC) 15 MG tablet  ondansetron (ZOFRAN ODT) 4 MG ODT tab  phenylephrine-cocoa butter (PREPARATION H) 0.25-88.44 % suppository          Review of Systems   Constitutional:  Negative for fatigue and fever.   Eyes:  Positive for photophobia. Negative for pain and visual disturbance.   Respiratory:  Negative for cough, choking and shortness of breath.    Gastrointestinal:  Positive for nausea. Negative for constipation, diarrhea and vomiting.   Musculoskeletal:  Positive for neck pain. Negative for gait problem and joint swelling.   Skin:  Negative for rash and wound.   Neurological:  Positive for headaches. Negative for dizziness, tremors, seizures, syncope, speech difficulty, weakness and numbness.       Physical Exam   BP: (!) 155/97  Pulse: 93  Temp: 97.8  F (36.6  C)  Resp: 20  Height: 167 cm (5' 5.75\")  Weight: 127 kg (280 lb)  SpO2: 96 %      Physical Exam  Constitutional:       General: He is not in acute distress.     Appearance: Normal appearance. He is obese. He is not toxic-appearing.   HENT:      Head: Normocephalic and atraumatic.      Nose: Nose normal.      Mouth/Throat:      Mouth: Mucous membranes are moist.   Eyes:      General: No scleral icterus.     Extraocular Movements: Extraocular movements intact.      Conjunctiva/sclera: Conjunctivae normal.      Pupils: Pupils are equal, round, and reactive to light.   Cardiovascular:      Rate and Rhythm: Normal rate and regular rhythm.      Pulses: Normal pulses.      Heart sounds: Normal heart sounds.   Pulmonary:      Effort: Pulmonary effort is normal. No respiratory distress.      Breath sounds: Normal breath sounds.   Abdominal:      General: Abdomen is flat.      Palpations: Abdomen is soft.      Tenderness: There is no abdominal " tenderness.   Musculoskeletal:         General: No deformity.      Cervical back: Neck supple.   Skin:     General: Skin is warm.   Neurological:      General: No focal deficit present.      Mental Status: He is alert.   Psychiatric:         Mood and Affect: Mood normal.         Behavior: Behavior normal.         ED Course     ED Course as of 02/28/24 2105 Wed Feb 28, 2024 1958 Patient presents from work after 1 hour of being hit on the head.  Part of the wall fell and hit him in the back of the head.  He thinks it was about 15 pounds.  He is having headache dizziness light sensitivity and neck pain     Procedures              Critical Care time:  none               Results for orders placed or performed during the hospital encounter of 02/28/24 (from the past 24 hour(s))   CT Head w/o Contrast    Narrative    Exam: CT HEAD W/O CONTRAST    Clinical history:43 years Male work comp head injury    Comparisons:None    Technique: Axial CT imaging of the head was performed Without  intervenous contrast.  This exam was performed using one or more of the following dose  reduction techniques:  Automated exposure control, adjustment of the ANISA and/or KV according  to patient's size, and/or use of iterative reconstruction technique.    FINDINGS:   Ventricles and sulci are symmetric. The gray-white matter  differentiation throughout the brain is well maintained. There is no  evidence of intracranial mass or hemorrhage. Visualized portions of  the paranasal sinuses and mastoid air cells are well aerated. There is  no evidence of skull fracture.      Impression    IMPRESSION: Negative Head CT    STERLING SALEH MD         SYSTEM ID:  [id number]   CT Cervical Spine w/o Contrast    Narrative    Exam:CT CERVICAL SPINE W/O CONTRAST    History:43 years Male work comp head injury. neck pain, HA, dizziness    Comparisons:None    Technique: Axial CT imaging of the cervical spine was performed.  Coronal and sagittal reconstructions  were obtained.  This exam was performed using one or more of the following dose  reduction techniques:  Automated exposure control, adjustment of the ANISA and/or KV according  to patient's size, and/or use of iterative reconstruction technique.    Findings:Alignment of the cervical spine is normal. There is no  evidence of subluxation or fracture.      Impression    IMPRESSION: No evidence of fracture or subluxation of the cervical  spine.    STERLING SALEH MD         SYSTEM ID:  [id number]       Medications   ondansetron (ZOFRAN ODT) ODT tab 4 mg (4 mg Oral $Given 2/28/24 2040)       Assessments & Plan (with Medical Decision Making)     I have reviewed the nursing notes.    I have reviewed the findings, diagnosis, plan and need for follow up with the patient.           Medical Decision Making  The patient's presentation was of low complexity (an acute and uncomplicated illness or injury).    The patient's evaluation involved:  ordering and/or review of 2 test(s) in this encounter (see separate area of note for details)    The patient's management necessitated moderate risk (prescription drug management including medications given in the ED).        New Prescriptions    ONDANSETRON (ZOFRAN ODT) 4 MG ODT TAB    Take 1 tablet (4 mg) by mouth every 8 hours as needed for nausea       Final diagnoses:   Injury of head, initial encounter   Patient may have a concussion.  He is having light sensitivity and nausea.  He was given Zofran with improvement in symptoms.  He did not request anything more than Tylenol or ibuprofen for his pain which she has at home.  I have given him a work note to be out for the next 2 days until he can be reevaluated for postconcussive and head injury symptoms.  Advised strict brain rest for the next 48 hours.  All questions answered.  He will return if any increasing symptoms such as vomiting, increasing dizziness, blurry or double vision worse head pain etc.    2/28/2024   [facility]  AND [facility]       Heidi Wooten DO  02/28/24 [id number]

## 2024-03-04 ENCOUNTER — OFFICE VISIT (OUTPATIENT)
Dept: FAMILY MEDICINE | Facility: OTHER | Age: 44
End: 2024-03-04
Attending: CHIROPRACTOR
Payer: OTHER MISCELLANEOUS

## 2024-03-04 VITALS
WEIGHT: 287 LBS | HEART RATE: 90 BPM | RESPIRATION RATE: 16 BRPM | SYSTOLIC BLOOD PRESSURE: 124 MMHG | BODY MASS INDEX: 46.68 KG/M2 | DIASTOLIC BLOOD PRESSURE: 87 MMHG | OXYGEN SATURATION: 100 %
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
## 2024-03-04 DIAGNOSIS — G44.209 ACUTE NON INTRACTABLE TENSION-TYPE HEADACHE: ICD-10-CM

## 2024-03-04 PROCEDURE — 99213 OFFICE O/P EST LOW 20 MIN: CPT | Performed by: CHIROPRACTOR

## 2024-03-04 ASSESSMENT — PAIN SCALES - GENERAL: PAINLEVEL: MILD PAIN (2)

## 2024-03-04 NOTE — PROGRESS NOTES
CHIEF COMPLAINT:   Chief Complaint   Patient presents with    Work Comp       HISTORY OF PRESENTING INJURY     Dontrell is a pleasant 42 y/o male who sustained an injury on February 28, 2023 at approximately 6:00 pm while employed at [employer].  He was loading/unloading a trailer and a stack of packages fell, with some light packages striking him on the back of the neck and right back of his head while turned away.  He had headaches and some sensitivity to light initially but this is all improved to approximately 80% since the DOI.  He has mild headaches now and still some eye disturbances but no sleepiness, vertigo, or nausea.    PAST MEDICAL HISTORY:  Past Medical History:   Diagnosis Date    Mumps without complication     age 14       PAST SURGICAL HISTORY:  Past Surgical History:   Procedure Laterality Date    APPENDECTOMY OPEN      No Comments Provided       ALLERGIES:  Allergies   Allergen Reactions    Bee Venom Rash       CURRENT MEDICATIONS:  Current Outpatient Medications   Medication Sig Dispense Refill    EPINEPHrine (ANY BX GENERIC EQUIV) 0.3 MG/0.3ML injection 2-pack Inject 0.3 mLs (0.3 mg) into the muscle as needed for anaphylaxis May repeat one time in 5-15 minutes if response to initial dose is inadequate. 2 each 1    meloxicam (MOBIC) 15 MG tablet Take 1 tablet (15 mg) by mouth daily 30 tablet 4    ondansetron (ZOFRAN ODT) 4 MG ODT tab Take 1 tablet (4 mg) by mouth every 8 hours as needed for nausea 5 tablet 0    phenylephrine-cocoa butter (PREPARATION H) 0.25-88.44 % suppository Place 1 suppository rectally as needed for hemorrhoids or itching 24 suppository 0       SOCIAL HISTORY:  Social History     Socioeconomic History    Marital status:      Spouse name: Not on file    Number of children: Not on file    Years of education: Not on file    Highest education level: Not on file   Occupational History    Not on file   Tobacco Use    Smoking status: Never    Smokeless tobacco: Never   Vaping Use     Vaping Use: Never used   Substance and Sexual Activity    Alcohol use: Not Currently     Comment: on occasion    Drug use: Not Currently     Comment: Drug use: No    Sexual activity: Not Currently     Partners: Female   Other Topics Concern    Not on file   Social History Narrative    From [place]    Runs business selling [email or web address]         Social Determinants of Health     Financial Resource Strain: Not on file   Food Insecurity: Low Risk  (2/14/2024)    Food Insecurity     Within the past 12 months, did you worry that your food would run out before you got money to buy more?: No     Within the past 12 months, did the food you bought just not last and you didn t have money to get more?: No   Transportation Needs: Not on file   Physical Activity: Not on file   Stress: Not on file   Social Connections: Not on file   Interpersonal Safety: Low Risk  (3/4/2024)    Interpersonal Safety     Do you feel physically and emotionally safe where you currently live?: Yes     Within the past 12 months, have you been hit, slapped, kicked or otherwise physically hurt by someone?: No     Within the past 12 months, have you been humiliated or emotionally abused in other ways by your partner or ex-partner?: No   Housing Stability: Not on file       FAMILY HISTORY:  Family History   Problem Relation Age of Onset    Other - See Comments Mother         RA    Diabetes Maternal Grandfather         Diabetes    Heart Disease Maternal Grandfather         Heart Disease    Cancer Maternal Grandfather         Cancer,pancreatic    Breast Cancer Maternal Grandmother         Cancer-breast    Cancer Paternal Grandmother         Cancer,pancreatic    No Known Problems Father        REVIEW OF SYSTEMS:    Unremarkable other than chief complaint      PHYSICAL EXAM:   /87   Pulse 90   Resp 16   Wt 130.2 kg (287 lb)   SpO2 100%   BMI 46.68 kg/m   Body mass index is 46.68 kg/m .    General Appearance: No acute distress.  Normal mentation,  appropriate responses and normal dialogue.  Normal transitions, appropriate gait and stance.  Normal eye tracking.  Normal accommodation.  Eye exam is normal.  Equal pupillary reactions to light.  No nystagmus.    Cervical Spine:  Range of motion is full.  Negative for spasm of the cervical spine.  Negative Spurling's test.    IMPRESSION/PLAN:    His exam is normal.  I will issue full workability today and instructed him to contact me if he is having any residual concerning symptoms.  These were gone over with him extensively.  Should he not hear from by March 20, 2024, we will close this chart out.  Workability issued to him and he had no additional questions and understood the plan going forward.    Total time spent today in chart review/preparation, face to face evaluation, and documentation: 31 minutes.      Viraj Mcleod DC, RALPH, TATIANA  Director - Occupational Medicine Department  Diplomate of the American Board of Forensic Professionals  Board Certified - American Board of Independent Medical Examiners      9:32 AM 3/4/2024

## 2024-04-14 ENCOUNTER — HEALTH MAINTENANCE LETTER (OUTPATIENT)
Age: 44
End: 2024-04-14

## 2024-11-12 ENCOUNTER — TELEPHONE (OUTPATIENT)
Dept: NURSING | Facility: CLINIC | Age: 44
End: 2024-11-12

## 2024-11-12 ENCOUNTER — ALLIED HEALTH/NURSE VISIT (OUTPATIENT)
Dept: FAMILY MEDICINE | Facility: OTHER | Age: 44
End: 2024-11-12
Attending: INTERNAL MEDICINE
Payer: COMMERCIAL

## 2024-11-12 DIAGNOSIS — R50.9 FEVER: Primary | ICD-10-CM

## 2024-11-12 DIAGNOSIS — R51.9 HEADACHE: ICD-10-CM

## 2024-11-12 DIAGNOSIS — R52 BODY ACHES: ICD-10-CM

## 2024-11-12 LAB — SARS-COV-2 RNA RESP QL NAA+PROBE: POSITIVE

## 2024-11-12 PROCEDURE — 87635 SARS-COV-2 COVID-19 AMP PRB: CPT | Mod: ZL

## 2024-11-12 NOTE — PROGRESS NOTES
Patient presents for Covid testing. States he is symptomatic and tested positive at home. Patient needs letter for work. Covid test preformed. Would like his note sent to him via My Chart.    Clair Landin RN on 11/12/2024 at 11:00 AM

## 2024-11-12 NOTE — TELEPHONE ENCOUNTER
Coronavirus (COVID-19) Notification    Caller Name (Patient, parent, daughter/son, grandparent, etc)  PT    Reason for call  Notify of Positive Coronavirus (COVID-19) lab results, assess symptoms,  review [facility] recommendations    Lab Result    Lab test:  2019-nCoV rRt-PCR or SARS-CoV-2 PCR    Oropharyngeal AND/OR nasopharyngeal swabs is POSITIVE for 2019-nCoV RNA/SARS-COV-2 PCR (COVID-19 virus)      Gather patient reported symptoms   Assessment   Current Symptoms at time of phone call, reported by patient: (if no symptoms, document: No symptoms] Cough, no sense of smell, low fever   Date of symptom(s) onset (if applicable) 11/11/2024     If at time of call, Patients symptoms have worsened, the Patient should contact 911 or have someone drive them to Emergency Dept promptly:    If Patient calling 911, inform 911 personal that you have tested positive for the Coronavirus (COVID-19).  Place mask on and await 911 to arrive.  If Emergency Dept, If possible, please have another adult drive you to the Emergency Dept but you need to wear mask when in contact with other people.      Treatment Options:   Is patient interested in discussing COVID treatment? No.        Review information with Patient    Your result was positive. This means you have COVID-19 (coronavirus).    How can I protect others?    These guidelines are for isolating before returning to work, school or .    If you DO have symptoms  Stay home and away from others   For at least 5 days after your symptoms started, AND  You are fever free for 24 hours (with no medicine that reduces fever), AND  Your other symptoms are better  Wear a mask for 10 full days anytime you are around others    If you DON'T have symptoms  Stay home and away from others for at least 5 days after your positive test  Wear a mask for 10 full days anytime you are around others    There may be different guidelines for healthcare facilities.  Please check with the specific  sites before arriving.    If you have been told by a doctor that you were severely ill with COVID-19 or are immunocompromised, you should isolate for at least 10 days.    You should not go back to work until you meet the guidelines above for ending your home isolation. You don't need to be retested for COVID-19 before going back to work--studies show that you won't spread the virus if it's been at least 10 days since your symptoms started (or 20 days, if you have a weak immune system).    Employers, schools, and daycares: This is an official notice for this person's medical guidelines for returning in-person.  They must meet the above guidelines before going back to work, school or  in person.    You will receive a positive COVID-19 letter via [employer] or the mail soon with additional self-care information.    Would you like me to review some of that information with you now?  No    If you were tested for an upcoming procedure, please contact your provider for next steps.    Tamara Sharp

## 2024-11-12 NOTE — PROGRESS NOTES
Positive COVID letter sent to Pt via [employer]. Kinjal Lala RN .............. 11/12/2024  11:22 AM

## 2024-11-12 NOTE — LETTER
November 12, 2024      Dontrell Garcia  [address] [place] [address]          SARS CoV2 PCR (no units)   Date Value   11/12/2024 Positive (A)          This letter provides a written record that you were tested for COVID-19. Your result was positive. This means you have COVID-19 (coronavirus).    Is there medicine to treat COVID-19?  Yes, there are antiviral (virus-killing) medicines available. These have been proven safe and effective. They may make you feel better faster, keep you out of the hospital, and prevent death.   It's very important to take them early in your illness before you get worse.   Who should take this medicine?   These treatments are for people who are not in the hospital, but who are at risk of getting very sick from COVID. This includes people who:  Are over age 50  Are members of the [employer] community (Black, Indigenous, and People of Color)  Are overweight (body mass index is over 25)   Are inactive (don't exercise)  Are pregnant  Smoke or vape (now or in the past)  Have a disability  Have any of the following health problems: diabetes, high blood pressure, cancer, heart problems, liver disease, lung disease, kidney disease, sickle cell disease, cystic fibrosis (CF), dementia and other neurological (brain) diseases, HIV, thalassemia, or tuberculosis  Have ever had a stroke, organ transplant, or blood cell transplant  Have a mental health problem or substance abuse disorder (drugs, alcohol)  Have a weak immune system (are immuno-compromised).   If your risk is high, you may be eligible for treatment.   To discuss COVID-19 treatment, you can:  Call your family clinic. Or, call [email or web address] app ([phone]).  Send a [facility] message to your care team.    How can I take care of myself?  Get lots of rest. Drink extra fluids (unless a doctor has told you not to).  Take acetaminophen (Tylenol) for fever or pain. If you have liver or kidney problems, ask your family doctor if it's okay  to take acetaminophen (Tylenol).   Take either:   650 mg (two 325 mg pills) every 4 to 6 hours (up to 10 pills per day), or   1,000 mg (two 500 mg pills) every 6 hours as needed (up to 6 pills per day).   Note: Don't take more than 3,000 mg of acetaminophen (Tylenol) in one day. Acetaminophen is found in many medicines (both prescribed and over-the-counter medicines). Read all labels to be sure you don't take too much.  For children:  Check the acetaminophen (Tylenol) bottle for the right dose (based on their age or weight).   Don't give children more than 1,625 mg of acetaminophen in one day.   If you have other health problems (like cancer, heart failure, an organ transplant, or severe kidney disease): Call your specialty clinic if you don't feel better in the next 2 days.    Know when to call 911: Emergency warning signs include:  Trouble breathing or shortness of breath  Pain or pressure in the chest that doesn't go away  Feeling confused like you haven't felt before, or not being able to wake up  Bluish-colored lips or face.      How can I protect others?  If you DO have symptoms:  Stay home and away from others (self-isolate). You can go back to being with other people when:  You've had no fever for 24 hours--without taking any medicine that reduces fever, AND  Your other symptoms (such as a cough) are better.  Wear a mask or face covering for 5 full days anytime you're around other people.  If you DON'T have symptoms:  Wear a mask or face covering for 5 full days anytime you're around other people.    During self-isolation:  Stay home until it's safe to be around others.   At home, stay away from other people and pets. Or, wear a well-fitting mask when you need to be around others.  Monitor your symptoms. If you have any emergency warning signs (including trouble breathing), seek emergency medical care right away.  Stay in a separate room from other household members, if possible.  Use a separate bathroom, if  possible.  Improve ventilation at home, if possible.  Don't share personal household items, like cups, towels, and utensils.  If you plan to visit a clinic or hospital, please check their visitor guidelines before you arrive--healthcare sites may have different rules. If you were tested because you're going to have surgery or another procedure, contact your care team for next steps.  You should NOT go back to work until you meet the guidelines above for ending your home isolation. You don't need to be re-tested for COVID-19 before going back to work. Studies show that you won't spread the virus if it's been at least 10 days since your symptoms started (or 20 days if you have a weak immune system).    Employers, schools and daycares: This document serves as formal notice of medical guidelines before your employee or student can return to work or school. They must meet the above guidelines before going back in person.    Where can I get more information?   [employer] [employer] [place] - About COVID-19:   [email or web address]  Preventing Spread of Respiratory Viruses when You're Sick: [email or web address]

## 2025-01-07 ENCOUNTER — OFFICE VISIT (OUTPATIENT)
Dept: FAMILY MEDICINE | Facility: OTHER | Age: 45
End: 2025-01-07
Attending: FAMILY MEDICINE
Payer: COMMERCIAL

## 2025-01-07 VITALS
SYSTOLIC BLOOD PRESSURE: 138 MMHG | RESPIRATION RATE: 18 BRPM | WEIGHT: 300 LBS | HEIGHT: 68 IN | OXYGEN SATURATION: 95 % | BODY MASS INDEX: 45.47 KG/M2 | TEMPERATURE: 97.7 F | DIASTOLIC BLOOD PRESSURE: 88 MMHG | HEART RATE: 90 BPM

## 2025-01-07 DIAGNOSIS — R07.0 THROAT PAIN: Primary | ICD-10-CM

## 2025-01-07 DIAGNOSIS — H92.09 OTALGIA, UNSPECIFIED LATERALITY: ICD-10-CM

## 2025-01-07 DIAGNOSIS — J06.9 UPPER RESPIRATORY TRACT INFECTION, UNSPECIFIED TYPE: ICD-10-CM

## 2025-01-07 LAB
FLUAV RNA SPEC QL NAA+PROBE: NEGATIVE
FLUBV RNA RESP QL NAA+PROBE: NEGATIVE
RSV RNA SPEC NAA+PROBE: NEGATIVE
S PYO DNA THROAT QL NAA+PROBE: NOT DETECTED
SARS-COV-2 RNA RESP QL NAA+PROBE: NEGATIVE

## 2025-01-07 PROCEDURE — 87637 SARSCOV2&INF A&B&RSV AMP PRB: CPT | Mod: ZL | Performed by: FAMILY MEDICINE

## 2025-01-07 PROCEDURE — 87651 STREP A DNA AMP PROBE: CPT | Mod: ZL | Performed by: FAMILY MEDICINE

## 2025-01-07 RX ORDER — IBUPROFEN 400 MG/1
TABLET, FILM COATED ORAL
COMMUNITY
Start: 2024-10-24

## 2025-01-07 ASSESSMENT — PAIN SCALES - GENERAL: PAINLEVEL_OUTOF10: MODERATE PAIN (5)

## 2025-01-07 NOTE — PROGRESS NOTES
"      Jose Jon is a 44 year old, presenting for the following health issues:  Throat Problem (Sore throat for 5 days)      1/7/2025     8:53 AM   Additional Questions   Roomed by Diandra Ceja LPN     History of Present Illness       Reason for visit:  Ear throat infection  Symptom onset:  3-7 days ago   He is taking medications regularly.                     Objective    /88   Pulse 90   Temp 97.7  F (36.5  C) (Temporal)   Resp 18   Ht 1.717 m (5' 7.58\")   Wt 136.1 kg (300 lb)   SpO2 95%   BMI 46.18 kg/m    Body mass index is 46.18 kg/m .  Physical Exam               Signed Electronically by: Herman Allen MD    "

## 2025-01-07 NOTE — LETTER
2025    Dontrell A Radha   1980        To Whom it May Concern;    Please excuse Dontrell A Radha from work for a healthcare visit on 2025 for illness.  He should be off work 25 through 1/10/25.  He may return to work when better.     Sincerely,        Herman Allen MD

## 2025-01-07 NOTE — PROGRESS NOTES
"Nursing Notes:   Diandra Ceja LPN  1/7/2025  9:15 AM  Signed  Chief Complaint   Patient presents with    Throat Problem     Sore throat for 5 days   He has had a sore throat and right ear pain for 5 days.  Diandra Ceja LPN..................1/7/2025   8:58 AM      Initial /88   Pulse 90   Temp 97.7  F (36.5  C) (Temporal)   Resp 18   Ht 1.717 m (5' 7.58\")   Wt 136.1 kg (300 lb)   SpO2 95%   BMI 46.18 kg/m   Estimated body mass index is 46.18 kg/m  as calculated from the following:    Height as of this encounter: 1.717 m (5' 7.58\").    Weight as of this encounter: 136.1 kg (300 lb).  Medication Review: complete    The next two questions are to help us understand your food security.  If you are feeling you need any assistance in this area, we have resources available to support you today.          2/14/2024   SDOH- Food Insecurity   Within the past 12 months, did you worry that your food would run out before you got money to buy more? N   Within the past 12 months, did the food you bought just not last and you didn t have money to get more? N         Health Care Directive:  Patient does not have a Health Care Directive: Discussed advance care planning with patient; however, patient declined at this time.    Diandra Ceja LPN        SUBJECTIVE:  Dontrell Garcia  is a 44 year old male who comes in today because of a sore ear and sore throat. It hurts to eat food. This started about 5 days ago.  He had some muffled hearing, but no popping or snapping. No fever.  Mild cough and stuffy nose. No GI symptoms.  He worked Friday, but felt terrible with any of the day.  He worked again yesterday and felt terrible at the end of the day.  He decided he should come in since this was lingering.  No specific sick contacts that he is aware of.  He did have COVID 2 months ago.    He had been following with Dr. Schaffer for obesity and insulin resistance.    Past Medical, Family, and Social History reviewed " and updated as noted below.   ROS is negative except as noted above       Allergies   Allergen Reactions    Bee Venom Rash   ,   Family History   Problem Relation Age of Onset    Other - See Comments Mother         RA    Diabetes Maternal Grandfather         Diabetes    Heart Disease Maternal Grandfather         Heart Disease    Cancer Maternal Grandfather         Cancer,pancreatic    Breast Cancer Maternal Grandmother         Cancer-breast    Cancer Paternal Grandmother         Cancer,pancreatic    No Known Problems Father    ,   Current Outpatient Medications   Medication Sig Dispense Refill    EPINEPHrine (ANY BX GENERIC EQUIV) 0.3 MG/0.3ML injection 2-pack Inject 0.3 mLs (0.3 mg) into the muscle as needed for anaphylaxis May repeat one time in 5-15 minutes if response to initial dose is inadequate. 2 each 1    ibuprofen (ADVIL/MOTRIN) 400 MG tablet TAKE 1 TABLET BY MOUTH NOW THEN 1 EVERY 6 HOURS FOR 2 DAYS THEN 1 EVERY 6 HOURS AS NEEDED      meloxicam (MOBIC) 15 MG tablet Take 1 tablet (15 mg) by mouth daily (Patient not taking: Reported on 1/7/2025) 30 tablet 4    ondansetron (ZOFRAN ODT) 4 MG ODT tab Take 1 tablet (4 mg) by mouth every 8 hours as needed for nausea (Patient not taking: Reported on 1/7/2025) 5 tablet 0    phenylephrine-cocoa butter (PREPARATION H) 0.25-88.44 % suppository Place 1 suppository rectally as needed for hemorrhoids or itching (Patient not taking: Reported on 1/7/2025) 24 suppository 0     No current facility-administered medications for this visit.   ,   Past Medical History:   Diagnosis Date    Mumps without complication     age 14   ,   Patient Active Problem List    Diagnosis Date Noted    Hypertriglyceridemia 07/17/2018     Priority: Medium    Mixed hyperlipidemia 07/17/2018     Priority: Medium    Essential hypertension 07/16/2018     Priority: Medium    Morbid obesity due to excess calories (H) 07/16/2018     Priority: Medium    Pre-diabetes 07/16/2018     Priority: Medium   ,  "  Past Surgical History:   Procedure Laterality Date    APPENDECTOMY OPEN      No Comments Provided    and   Social History     Tobacco Use    Smoking status: Never     Passive exposure: Never    Smokeless tobacco: Never   Substance Use Topics    Alcohol use: Not Currently     Comment: on occasion     OBJECTIVE:  /88   Pulse 90   Temp 97.7  F (36.5  C) (Temporal)   Resp 18   Ht 1.717 m (5' 7.58\")   Wt 136.1 kg (300 lb)   SpO2 95%   BMI 46.18 kg/m     EXAM:  Alert and cooperative, no distress.  Nose is minimally congested.  Throat is clear but postnasal drainage is seen.  TMs are clear bilaterally.  Neck is supple without significant adenopathy.  Lungs are clear, no rales rhonchi or wheezes are heard.  Cardiac RRR without murmur.      ASSESSMENT/Plan :    Dontrell was seen today for throat problem.    Diagnoses and all orders for this visit:    Throat pain  -     Influenza A/B, RSV and SARS-CoV2 PCR (COVID-19); Future  -     Influenza A/B, RSV and SARS-CoV2 PCR (COVID-19) Nose  -     Group A Streptococcus PCR Throat Swab    Otalgia, unspecified laterality  -     Influenza A/B, RSV and SARS-CoV2 PCR (COVID-19); Future  -     Influenza A/B, RSV and SARS-CoV2 PCR (COVID-19) Nose    Upper respiratory tract infection, unspecified type  -     Influenza A/B, RSV and SARS-CoV2 PCR (COVID-19); Future  -     Influenza A/B, RSV and SARS-CoV2 PCR (COVID-19) Nose      Will notify of lab results when available.  Discussed symptomatic treatment.  Rest, fluids, analgesics.  If positive for strep we will treat with antibiotic.  Unlikely to be COVID.  May be out of the window for treatment for influenza.  Given for him to be off work until better.      Herman Allen MD      Answers submitted by the patient for this visit:  General Questionnaire (Submitted on 1/7/2025)  Chief Complaint: Chronic problems general questions HPI Form  How many days per week do you miss taking your medication?: 0  General Concern (Submitted on " 1/7/2025)  Chief Complaint: Chronic problems general questions HPI Form  What is the reason for your visit today?: ear throat infection  When did your symptoms begin?: 3-7 days ago  Questionnaire about: Chronic problems general questions HPI Form (Submitted on 1/7/2025)  Chief Complaint: Chronic problems general questions HPI Form

## 2025-01-07 NOTE — NURSING NOTE
"Chief Complaint   Patient presents with    Throat Problem     Sore throat for 5 days   He has had a sore throat and right ear pain for 5 days.  Diandra Ceja LPN..................1/7/2025   8:58 AM      Initial /88   Pulse 90   Temp 97.7  F (36.5  C) (Temporal)   Resp 18   Ht 1.717 m (5' 7.58\")   Wt 136.1 kg (300 lb)   SpO2 95%   BMI 46.18 kg/m   Estimated body mass index is 46.18 kg/m  as calculated from the following:    Height as of this encounter: 1.717 m (5' 7.58\").    Weight as of this encounter: 136.1 kg (300 lb).  Medication Review: complete    The next two questions are to help us understand your food security.  If you are feeling you need any assistance in this area, we have resources available to support you today.          2/14/2024   SDOH- Food Insecurity   Within the past 12 months, did you worry that your food would run out before you got money to buy more? N   Within the past 12 months, did the food you bought just not last and you didn t have money to get more? N         Health Care Directive:  Patient does not have a Health Care Directive: Discussed advance care planning with patient; however, patient declined at this time.    Diandra Ceja LPN      "

## 2025-03-27 ENCOUNTER — HOSPITAL ENCOUNTER (OUTPATIENT)
Dept: ULTRASOUND IMAGING | Facility: OTHER | Age: 45
Discharge: HOME | End: 2025-03-27
Attending: STUDENT IN AN ORGANIZED HEALTH CARE EDUCATION/TRAINING PROGRAM
Payer: COMMERCIAL

## 2025-03-27 DIAGNOSIS — R22.1 NECK SWELLING: ICD-10-CM

## 2025-03-27 PROCEDURE — 76536 US EXAM OF HEAD AND NECK: CPT

## 2025-04-19 ENCOUNTER — HEALTH MAINTENANCE LETTER (OUTPATIENT)
Age: 45
End: 2025-04-19

## 2025-08-19 PROBLEM — E88.810 INSULIN RESISTANCE SYNDROME: Status: ACTIVE | Noted: 2024-03-01

## 2025-08-27 ENCOUNTER — OFFICE VISIT (OUTPATIENT)
Dept: FAMILY MEDICINE | Facility: OTHER | Age: 45
End: 2025-08-27
Payer: COMMERCIAL

## 2025-08-27 VITALS
WEIGHT: 282 LBS | BODY MASS INDEX: 49.97 KG/M2 | OXYGEN SATURATION: 95 % | SYSTOLIC BLOOD PRESSURE: 128 MMHG | TEMPERATURE: 98.1 F | DIASTOLIC BLOOD PRESSURE: 76 MMHG | HEART RATE: 92 BPM

## 2025-08-27 DIAGNOSIS — Z20.9 EXPOSURE TO BAT WITHOUT KNOWN BITE: Primary | ICD-10-CM

## 2025-08-28 ASSESSMENT — ENCOUNTER SYMPTOMS
CONSTITUTIONAL NEGATIVE: 1
CARDIOVASCULAR NEGATIVE: 1
RESPIRATORY NEGATIVE: 1

## (undated) RX ORDER — ASPIRIN 81 MG/1
TABLET, CHEWABLE ORAL
Status: DISPENSED
Start: 2020-05-09

## (undated) RX ORDER — NITROGLYCERIN 0.4 MG/1
TABLET SUBLINGUAL
Status: DISPENSED
Start: 2020-05-09

## (undated) RX ORDER — ASPIRIN 81 MG/1
TABLET, CHEWABLE ORAL
Status: DISPENSED
Start: 2019-08-05

## (undated) RX ORDER — ONDANSETRON 4 MG/1
TABLET, ORALLY DISINTEGRATING ORAL
Status: DISPENSED
Start: 2024-02-28

## (undated) RX ORDER — ALUMINA, MAGNESIA, AND SIMETHICONE 2400; 2400; 240 MG/30ML; MG/30ML; MG/30ML
SUSPENSION ORAL
Status: DISPENSED
Start: 2020-05-09